# Patient Record
Sex: MALE | Race: OTHER | Employment: FULL TIME | URBAN - METROPOLITAN AREA
[De-identification: names, ages, dates, MRNs, and addresses within clinical notes are randomized per-mention and may not be internally consistent; named-entity substitution may affect disease eponyms.]

---

## 2017-11-02 ENCOUNTER — APPOINTMENT (OUTPATIENT)
Dept: OTHER | Facility: HOSPITAL | Age: 47
End: 2017-11-02
Attending: EMERGENCY MEDICINE

## 2018-12-05 ENCOUNTER — OFFICE VISIT (OUTPATIENT)
Dept: FAMILY MEDICINE CLINIC | Facility: CLINIC | Age: 48
End: 2018-12-05
Payer: COMMERCIAL

## 2018-12-05 VITALS
HEART RATE: 85 BPM | HEIGHT: 68 IN | BODY MASS INDEX: 37.89 KG/M2 | WEIGHT: 250 LBS | OXYGEN SATURATION: 97 % | SYSTOLIC BLOOD PRESSURE: 142 MMHG | DIASTOLIC BLOOD PRESSURE: 100 MMHG

## 2018-12-05 DIAGNOSIS — E78.5 HYPERLIPIDEMIA, UNSPECIFIED HYPERLIPIDEMIA TYPE: ICD-10-CM

## 2018-12-05 DIAGNOSIS — Z12.11 COLON CANCER SCREENING: ICD-10-CM

## 2018-12-05 DIAGNOSIS — Z00.00 WELLNESS EXAMINATION: Primary | ICD-10-CM

## 2018-12-05 DIAGNOSIS — I10 ESSENTIAL HYPERTENSION: ICD-10-CM

## 2018-12-05 DIAGNOSIS — Z23 NEED FOR VACCINATION: ICD-10-CM

## 2018-12-05 DIAGNOSIS — Z12.5 PROSTATE CANCER SCREENING: ICD-10-CM

## 2018-12-05 DIAGNOSIS — Z00.00 HEALTHCARE MAINTENANCE: ICD-10-CM

## 2018-12-05 DIAGNOSIS — Z13.6 SCREENING FOR CARDIOVASCULAR CONDITION: ICD-10-CM

## 2018-12-05 PROCEDURE — 99202 OFFICE O/P NEW SF 15 MIN: CPT | Performed by: FAMILY MEDICINE

## 2018-12-05 PROCEDURE — 90686 IIV4 VACC NO PRSV 0.5 ML IM: CPT | Performed by: FAMILY MEDICINE

## 2018-12-05 PROCEDURE — 90471 IMMUNIZATION ADMIN: CPT | Performed by: FAMILY MEDICINE

## 2018-12-05 PROCEDURE — 99386 PREV VISIT NEW AGE 40-64: CPT | Performed by: FAMILY MEDICINE

## 2018-12-05 RX ORDER — AMLODIPINE BESYLATE 10 MG/1
10 TABLET ORAL DAILY
Qty: 90 TABLET | Refills: 0 | Status: SHIPPED | OUTPATIENT
Start: 2018-12-05 | End: 2019-04-02

## 2018-12-05 RX ORDER — ATORVASTATIN CALCIUM 40 MG/1
40 TABLET, FILM COATED ORAL NIGHTLY
Qty: 90 TABLET | Refills: 0 | Status: SHIPPED | OUTPATIENT
Start: 2018-12-05 | End: 2019-04-02

## 2018-12-05 RX ORDER — AMLODIPINE BESYLATE 10 MG/1
10 TABLET ORAL DAILY
COMMUNITY
End: 2018-12-05

## 2018-12-05 RX ORDER — VALSARTAN AND HYDROCHLOROTHIAZIDE 320; 25 MG/1; MG/1
1 TABLET, FILM COATED ORAL DAILY
COMMUNITY
End: 2018-12-05

## 2018-12-05 RX ORDER — ROSUVASTATIN CALCIUM 20 MG/1
20 TABLET, COATED ORAL NIGHTLY
COMMUNITY
End: 2018-12-05

## 2018-12-05 RX ORDER — LISINOPRIL AND HYDROCHLOROTHIAZIDE 25; 20 MG/1; MG/1
1 TABLET ORAL DAILY
Qty: 90 TABLET | Refills: 0 | Status: SHIPPED | OUTPATIENT
Start: 2018-12-05 | End: 2019-04-02

## 2018-12-05 NOTE — PROGRESS NOTES
CC: Annual Physical Exam    HPI:   Geni Moran is a 46year old male who presents for a complete physical exam.     HCM  -Diet:   well-balanced  -Exercise: irregularly  -Mental Health: denies any depression or anxiety sx  -Skin care:  no concerning lesions/ edema, dyspnea on exertion or at rest.  RESPIRATORY:  Denies shortness of breath, wheezing, cough or sputum. GASTROINTESTINAL:  Denies abdominal pain, nausea, vomiting, constipation, diarrhea, or blood in stool.   MUSCULOSKELETAL:  Denies weakness, muscle (14) [E]      Hemoglobin A1C (Glycohemoglobin) [E]      Lipid Panel [E]      TSH W Reflex To Free T4 [E]      PSA (Screening) [E]      Flulaval 6 months and older 0.5 ml Quad PF [53231]      1. Wellness examination  2. Healthcare maintenance  3.  BMI 38.0-3

## 2018-12-06 PROBLEM — I10 ESSENTIAL HYPERTENSION: Status: ACTIVE | Noted: 2018-12-06

## 2018-12-06 PROBLEM — E78.5 HYPERLIPIDEMIA: Status: ACTIVE | Noted: 2018-12-06

## 2019-01-02 PROBLEM — E11.9 TYPE 2 DIABETES MELLITUS WITHOUT COMPLICATION, WITHOUT LONG-TERM CURRENT USE OF INSULIN (HCC): Status: ACTIVE | Noted: 2019-01-02

## 2019-01-15 ENCOUNTER — OFFICE VISIT (OUTPATIENT)
Dept: FAMILY MEDICINE CLINIC | Facility: CLINIC | Age: 49
End: 2019-01-15
Payer: COMMERCIAL

## 2019-01-15 ENCOUNTER — OFFICE VISIT (OUTPATIENT)
Dept: GASTROENTEROLOGY | Facility: CLINIC | Age: 49
End: 2019-01-15
Payer: COMMERCIAL

## 2019-01-15 ENCOUNTER — TELEPHONE (OUTPATIENT)
Dept: GASTROENTEROLOGY | Facility: CLINIC | Age: 49
End: 2019-01-15

## 2019-01-15 VITALS
SYSTOLIC BLOOD PRESSURE: 143 MMHG | WEIGHT: 246.63 LBS | HEART RATE: 89 BPM | HEIGHT: 68 IN | BODY MASS INDEX: 37.38 KG/M2 | DIASTOLIC BLOOD PRESSURE: 90 MMHG

## 2019-01-15 VITALS
HEART RATE: 85 BPM | SYSTOLIC BLOOD PRESSURE: 140 MMHG | DIASTOLIC BLOOD PRESSURE: 92 MMHG | BODY MASS INDEX: 36.68 KG/M2 | OXYGEN SATURATION: 98 % | HEIGHT: 68 IN | RESPIRATION RATE: 12 BRPM | WEIGHT: 242 LBS

## 2019-01-15 DIAGNOSIS — Z12.5 PROSTATE CANCER SCREENING: ICD-10-CM

## 2019-01-15 DIAGNOSIS — E11.9 TYPE 2 DIABETES MELLITUS WITHOUT COMPLICATION, WITHOUT LONG-TERM CURRENT USE OF INSULIN (HCC): Primary | ICD-10-CM

## 2019-01-15 DIAGNOSIS — E78.5 HYPERLIPIDEMIA, UNSPECIFIED HYPERLIPIDEMIA TYPE: ICD-10-CM

## 2019-01-15 DIAGNOSIS — Z12.11 ENCOUNTER FOR SCREENING COLONOSCOPY: Primary | ICD-10-CM

## 2019-01-15 DIAGNOSIS — Z12.11 COLON CANCER SCREENING: Primary | ICD-10-CM

## 2019-01-15 DIAGNOSIS — I10 ESSENTIAL HYPERTENSION: ICD-10-CM

## 2019-01-15 PROCEDURE — 99243 OFF/OP CNSLTJ NEW/EST LOW 30: CPT | Performed by: PHYSICIAN ASSISTANT

## 2019-01-15 PROCEDURE — 99214 OFFICE O/P EST MOD 30 MIN: CPT | Performed by: FAMILY MEDICINE

## 2019-01-15 PROCEDURE — 99212 OFFICE O/P EST SF 10 MIN: CPT | Performed by: PHYSICIAN ASSISTANT

## 2019-01-15 RX ORDER — POLYETHYLENE GLYCOL 3350, SODIUM CHLORIDE, SODIUM BICARBONATE, POTASSIUM CHLORIDE 420; 11.2; 5.72; 1.48 G/4L; G/4L; G/4L; G/4L
POWDER, FOR SOLUTION ORAL
Qty: 1 BOTTLE | Refills: 0 | Status: SHIPPED | OUTPATIENT
Start: 2019-01-15 | End: 2019-07-17

## 2019-01-15 NOTE — H&P
[address] [facility] [address] Gastroenterology                                                                                                  Clinic History and Physical     Pa social    Drug use: No       Medications (Active prior to today's visit):    Current Outpatient Medications:  PEG 3350-KCl-Na Bicarb-NaCl (TRILYTE) 420 g Oral Recon Soln Split dose preparation - take as directed.  Disp: 1 Bottle Rfl: 0   MetFORMIN HCl 500 M Patient's labs and imaging were reviewed and discussed with patient today.       ASSESSMENT/PLAN:   Alexander Lee is a 46year old male patient of Dr. Rodriguez Members with history of HTN, dyslipidemia, obesity and DM, who presents for colon cancer screeni anesthesia and perforation all leading to prolonged hospitalization, surgical intervention, or even death. I also specifically mentioned the miss rate of colonoscopy of 5-10% in the best of all circumstances.  All questions were answered to the patient’s sa

## 2019-01-15 NOTE — TELEPHONE ENCOUNTER
Scheduled for:  Colonoscopy 87102  Provider Name:   Date:  3/29/19  Location:  [facility]  Sedation:  Mac  Time:  1200 Pm / Arrival 80 Am  Prep: Split dose Trilyte  Meds/Allergies Reconciled?: Physician reviewed   Diagnosis with codes:  Colon Cancer Scree

## 2019-01-15 NOTE — PATIENT INSTRUCTIONS
1. Schedule colonoscopy with Dr. Nile Zhou with MAC anesthesia @ [facility].  - Patient's first preference is a Saturday  - Second preference is Friday     2.  bowel prep from pharmacy - I have prescribed Trilyte split dose preparation  3.  Medication Changes:

## 2019-01-20 NOTE — PROGRESS NOTES
HPI:   Patient presents with:  Diabetes: Newly diagnosed DM Gina Mayfield is a 46year old male presenting for:  HTN/HLD  -Taking meds inconsisntently-tolerating well without SEs.    -Denies CP, Palpitations, Dizziness, Recent fall, leg edema, TRIGLYCERIDES 273 (H) <150 mg/dL    LDL-CHOLESTEROL 93 mg/dL (calc)    CHOL/HDLC RATIO 4.5 <5.0 (calc)    NON-HDL CHOLESTEROL 129 <130 mg/dL (calc)   TSH W REFLEX TO FREE T4   Result Value Ref Range    TSH W/REFLEX TO FT4 1.14 0.40 - 4.50 mIU/L       Labs: Drinks per session: 1 or 2      Comment: social    Drug use: No     Family History:  No family history on file. REVIEW OF SYSTEMS:   Review of Systems   Constitutional: Negative for chills, fatigue and fever.    Respiratory: Negative for cough and hal ADA and/or low fat/cholesterol DASH diet and exercise (3 times a week for 30+ minutes each time)  · Refer to Ophthalmology annually for routine eye exam  · Check feet daily.   Podiatry evaluation prn.      - HEMOGLOBIN A1C  - OPHTHALMOLOGY - INTERNAL  - COLLETTE Screen due on 12/05/2019  Annual Physical due on 12/05/2019  LDL Control due on 12/29/2019  Influenza Vaccine Completed      Kelli Cox MD

## 2019-03-17 LAB
ALBUMIN/GLOBULIN RATIO: 2 (CALC) (ref 1–2.5)
ALBUMIN: 4.5 G/DL (ref 3.6–5.1)
ALKALINE PHOSPHATASE: 65 U/L (ref 40–115)
ALT: 19 U/L (ref 9–46)
AST: 17 U/L (ref 10–35)
BILIRUBIN, TOTAL: 0.5 MG/DL (ref 0.2–1.2)
BUN: 13 MG/DL (ref 7–25)
CALCIUM: 9.4 MG/DL (ref 8.6–10.3)
CARBON DIOXIDE: 26 MMOL/L (ref 20–32)
CHLORIDE: 101 MMOL/L (ref 98–110)
CHOL/HDLC RATIO: 4.1 (CALC)
CHOLESTEROL, TOTAL: 147 MG/DL
CREATININE: 0.87 MG/DL (ref 0.7–1.33)
EGFR IF AFRICN AM: 116 ML/MIN/1.73M2
EGFR IF NONAFRICN AM: 100 ML/MIN/1.73M2
GLOBULIN: 2.3 G/DL (CALC) (ref 1.9–3.7)
GLUCOSE: 110 MG/DL (ref 65–99)
HDL CHOLESTEROL: 36 MG/DL
HEMOGLOBIN A1C: 6.7 % OF TOTAL HGB
LDL-CHOLESTEROL: 85 MG/DL (CALC)
NON-HDL CHOLESTEROL: 111 MG/DL (CALC)
POTASSIUM: 4 MMOL/L (ref 3.5–5.3)
PROTEIN, TOTAL: 6.8 G/DL (ref 6.1–8.1)
PSA, TOTAL: 0.5 NG/ML
SODIUM: 136 MMOL/L (ref 135–146)
TRIGLYCERIDES: 154 MG/DL

## 2019-03-19 NOTE — TELEPHONE ENCOUNTER
Unable to leave messages ,when I call this pt number I get a busy signal then a hang up. Called 2 times,unsuccessful.

## 2019-03-22 ENCOUNTER — TELEPHONE (OUTPATIENT)
Dept: FAMILY MEDICINE CLINIC | Facility: CLINIC | Age: 49
End: 2019-03-22

## 2019-03-22 NOTE — TELEPHONE ENCOUNTER
----- Message from Dayday Silva MD sent at 3/22/2019  1:47 PM CDT -----  Please let him know:  1) diabetes well-controlled. Continue meds  2) cholesterol improving. Continue meds.  Increase healthy cholesterol with fish, nuts and avocado for exa

## 2019-03-26 ENCOUNTER — TELEPHONE (OUTPATIENT)
Dept: GASTROENTEROLOGY | Facility: CLINIC | Age: 49
End: 2019-03-26

## 2019-03-26 DIAGNOSIS — Z12.11 COLON CANCER SCREENING: Primary | ICD-10-CM

## 2019-03-26 NOTE — TELEPHONE ENCOUNTER
Pts wife/Deepa calling to confirm CLN, informed about CLN requiring rescheduling. Pts wife/Deepa using a temporary ph for today only L#[phone], number on file out of service, will attempt to transfer call, wilber billy at:[phone],thanks.   *English ok

## 2019-03-26 NOTE — TELEPHONE ENCOUNTER
Scheduled for:  Colonoscopy 54333  Provider Name: Dr. Tatiana Peraza  Date:  4/16/19  Location:  [facility]  Sedation:  MAC  Time:   3055 (pt is aware to arrive at 0930)   Prep:  Juan Tobar, carla Congolese instructions 3/27/19  Meds/Allergies Reconciled?:  Physician reviewed

## 2019-04-02 ENCOUNTER — OFFICE VISIT (OUTPATIENT)
Dept: FAMILY MEDICINE CLINIC | Facility: CLINIC | Age: 49
End: 2019-04-02
Payer: COMMERCIAL

## 2019-04-02 VITALS
SYSTOLIC BLOOD PRESSURE: 120 MMHG | WEIGHT: 241 LBS | BODY MASS INDEX: 36.53 KG/M2 | OXYGEN SATURATION: 94 % | HEART RATE: 83 BPM | HEIGHT: 68 IN | DIASTOLIC BLOOD PRESSURE: 80 MMHG

## 2019-04-02 DIAGNOSIS — E11.9 TYPE 2 DIABETES MELLITUS WITHOUT COMPLICATION, WITHOUT LONG-TERM CURRENT USE OF INSULIN (HCC): ICD-10-CM

## 2019-04-02 DIAGNOSIS — E78.5 HYPERLIPIDEMIA, UNSPECIFIED HYPERLIPIDEMIA TYPE: ICD-10-CM

## 2019-04-02 DIAGNOSIS — I10 ESSENTIAL HYPERTENSION: Primary | ICD-10-CM

## 2019-04-02 PROCEDURE — 90732 PPSV23 VACC 2 YRS+ SUBQ/IM: CPT | Performed by: FAMILY MEDICINE

## 2019-04-02 PROCEDURE — 90471 IMMUNIZATION ADMIN: CPT | Performed by: FAMILY MEDICINE

## 2019-04-02 PROCEDURE — 99214 OFFICE O/P EST MOD 30 MIN: CPT | Performed by: FAMILY MEDICINE

## 2019-04-02 RX ORDER — ATORVASTATIN CALCIUM 40 MG/1
40 TABLET, FILM COATED ORAL NIGHTLY
Qty: 90 TABLET | Refills: 0 | Status: SHIPPED | OUTPATIENT
Start: 2019-04-02 | End: 2019-07-17

## 2019-04-02 RX ORDER — LISINOPRIL AND HYDROCHLOROTHIAZIDE 25; 20 MG/1; MG/1
1 TABLET ORAL DAILY
Qty: 90 TABLET | Refills: 0 | Status: SHIPPED | OUTPATIENT
Start: 2019-04-02 | End: 2019-07-17

## 2019-04-02 RX ORDER — AMLODIPINE BESYLATE 10 MG/1
10 TABLET ORAL DAILY
Qty: 90 TABLET | Refills: 0 | Status: SHIPPED | OUTPATIENT
Start: 2019-04-02 | End: 2019-07-17

## 2019-04-02 NOTE — PROGRESS NOTES
HPI:   Patient presents with:  Lab Results  Follow - Up: 3 months   Medication Follow-Up      Vicky Meneses is a 44year old male presenting for:  HTN/HLD  -Taking meds -tolerating well without SEs.    -Denies CP, Palpitations, Dizziness, Recent fall, leg e TRIG 154 (H) 03/16/2019 10:43 AM    LDL 85 03/16/2019 10:43 AM    NONHDLC 111 03/16/2019 10:43 AM       Lab Results   Component Value Date/Time     (H) 03/16/2019 10:43 AM     03/16/2019 10:43 AM    K 4.0 03/16/2019 10:43 AM     03/16 Negative for headaches. PHYSICAL EXAM:   /80   Pulse 83   Ht 68\"   Wt 241 lb   SpO2 94%   BMI 36.64 kg/m²  Estimated body mass index is 36.64 kg/m² as calculated from the following:    Height as of this encounter: 68\".     Weight as of this Podiatry evaluation prn. Follow-up in 3mo  Patient indicates understanding of the above recommendations and agrees to the above plan. Reasurrance and education provided. All questions answered.   Notified to call with any questions, complications, all

## 2019-04-15 ENCOUNTER — TELEPHONE (OUTPATIENT)
Dept: GASTROENTEROLOGY | Facility: CLINIC | Age: 49
End: 2019-04-15

## 2019-04-15 DIAGNOSIS — Z12.11 COLON CANCER SCREENING: Primary | ICD-10-CM

## 2019-04-15 NOTE — TELEPHONE ENCOUNTER
Pts wife/Deepa calling for pt, Cancelling CLN due to ins not covering for Dr Eduardo Walker procedure, thanks, [address] caller.

## 2019-04-15 NOTE — TELEPHONE ENCOUNTER
Cancelled for:  Colonoscopy 83503  Provider Name: Dr. Timmy Mcclellan  Date:  4/16/19  Location:  [facility]  Sedation:  MAC  Time:   56   Prep:  Estephanie Nunez  Meds/Allergies Reconciled?:  Physician reviewed   Diagnosis with codes:  Colon cancer screening Z12.11  Was patient

## 2019-04-16 ENCOUNTER — TELEPHONE (OUTPATIENT)
Dept: FAMILY MEDICINE CLINIC | Facility: CLINIC | Age: 49
End: 2019-04-16

## 2019-04-16 DIAGNOSIS — Z12.11 COLON CANCER SCREENING: Primary | ICD-10-CM

## 2019-04-16 NOTE — TELEPHONE ENCOUNTER
Pt's wife called, she has made an appointment with the GI that was referred, but after calling her insurance company she cancelled appointment. Insurance is telling her [facility] that is in-network.  She spoke to [employer] [facility], they are asking

## 2019-07-14 LAB
CHOL/HDLC RATIO: 3.2 (CALC)
CHOLESTEROL, TOTAL: 132 MG/DL
HDL CHOLESTEROL: 41 MG/DL
HEMOGLOBIN A1C: 6.1 % OF TOTAL HGB
LDL-CHOLESTEROL: 68 MG/DL (CALC)
NON-HDL CHOLESTEROL: 91 MG/DL (CALC)
TRIGLYCERIDES: 146 MG/DL

## 2019-07-17 ENCOUNTER — OFFICE VISIT (OUTPATIENT)
Dept: FAMILY MEDICINE CLINIC | Facility: CLINIC | Age: 49
End: 2019-07-17
Payer: COMMERCIAL

## 2019-07-17 VITALS
SYSTOLIC BLOOD PRESSURE: 126 MMHG | BODY MASS INDEX: 35.01 KG/M2 | HEIGHT: 68 IN | HEART RATE: 84 BPM | DIASTOLIC BLOOD PRESSURE: 88 MMHG | RESPIRATION RATE: 14 BRPM | OXYGEN SATURATION: 99 % | WEIGHT: 231 LBS

## 2019-07-17 DIAGNOSIS — E78.5 HYPERLIPIDEMIA, UNSPECIFIED HYPERLIPIDEMIA TYPE: ICD-10-CM

## 2019-07-17 DIAGNOSIS — E11.9 TYPE 2 DIABETES MELLITUS WITHOUT COMPLICATION, WITHOUT LONG-TERM CURRENT USE OF INSULIN (HCC): ICD-10-CM

## 2019-07-17 DIAGNOSIS — I10 ESSENTIAL HYPERTENSION: Primary | ICD-10-CM

## 2019-07-17 PROCEDURE — 99214 OFFICE O/P EST MOD 30 MIN: CPT | Performed by: FAMILY MEDICINE

## 2019-07-17 RX ORDER — LISINOPRIL AND HYDROCHLOROTHIAZIDE 25; 20 MG/1; MG/1
1 TABLET ORAL DAILY
Qty: 90 TABLET | Refills: 0 | Status: SHIPPED | OUTPATIENT
Start: 2019-07-17 | End: 2019-12-02

## 2019-07-17 RX ORDER — AMLODIPINE BESYLATE 10 MG/1
10 TABLET ORAL DAILY
Qty: 90 TABLET | Refills: 0 | Status: SHIPPED | OUTPATIENT
Start: 2019-07-17 | End: 2019-12-02

## 2019-07-17 RX ORDER — ATORVASTATIN CALCIUM 40 MG/1
40 TABLET, FILM COATED ORAL NIGHTLY
Qty: 90 TABLET | Refills: 0 | Status: SHIPPED | OUTPATIENT
Start: 2019-07-17 | End: 2019-12-02

## 2019-07-17 NOTE — PROGRESS NOTES
HPI:   Patient presents with:  Diabetes      Perry Gip is a 44year old male presenting for:  HTN/HLD  -Taking meds as prescribed-tolerating well without SEs.   -Home BP readings are wnl  -Denies CP, Palpitations, Dizziness, leg edema, SOB, LOC, HAs mouth daily.  Disp: 90 tablet Rfl: 0   metFORMIN HCl 500 MG Oral Tab Take 1 tablet (500 mg total) by mouth daily with breakfast. Disp: 90 tablet Rfl: 0      Past Medical History:   Diagnosis Date   • Essential hypertension    • High cholesterol          No presents primarily presents for:    1.  Essential hypertension  -stable; controlled   -continue meds  -bloodwork: utd   -watch salt intake, regular exercise, DASH/heart healthy eating  -monitor home BP daily and maintain log; if elevated reading >160/100 no 03/16/2021  Influenza Vaccine Completed      Luis Wright MD

## 2019-07-26 ENCOUNTER — TELEPHONE (OUTPATIENT)
Dept: GASTROENTEROLOGY | Facility: CLINIC | Age: 49
End: 2019-07-26

## 2019-07-26 DIAGNOSIS — Z12.11 COLON CANCER SCREENING: Primary | ICD-10-CM

## 2019-07-26 NOTE — TELEPHONE ENCOUNTER
Scheduled for:  Colonoscopy 24846  Provider Name: Dr. Stefani Box  Date:  8/27/19  Location:  [facility]  Sedation:  MAC  Time:   1477 (pt is aware to arrive at [address])   Prep:  Jerson, mailed Latvian instructions 7/29/19  Meds/Allergies Reconciled?:  Physician reviewed

## 2019-07-26 NOTE — TELEPHONE ENCOUNTER
Pts wife/Deepa calling to schedule procedure, asking for  (not ). Pls call at:  [phone],thanks.

## 2019-08-26 ENCOUNTER — TELEPHONE (OUTPATIENT)
Dept: GASTROENTEROLOGY | Facility: CLINIC | Age: 49
End: 2019-08-26

## 2019-08-26 NOTE — TELEPHONE ENCOUNTER
Wife calling to reschedule CLN. Attempt to transfer. Wife aware of at least 72hr call back time.  Please call [phone]

## 2019-08-26 NOTE — TELEPHONE ENCOUNTER
Jerome Matias from [facility] called to confirm below, as they had spoken to spouse, I removed from epic and forwarded to GI schedulers . Thanks. Left message on voicemail that schedulers would be calling within a week to reschedule.

## 2019-08-27 NOTE — TELEPHONE ENCOUNTER
[facility] to schedule procedure. Please transfer to Maryse Blum at ext [phone] or [phone] for scheduling.

## 2019-12-02 ENCOUNTER — TELEPHONE (OUTPATIENT)
Dept: FAMILY MEDICINE CLINIC | Facility: CLINIC | Age: 49
End: 2019-12-02

## 2019-12-02 RX ORDER — ATORVASTATIN CALCIUM 40 MG/1
40 TABLET, FILM COATED ORAL NIGHTLY
Qty: 30 TABLET | Refills: 0 | Status: SHIPPED | OUTPATIENT
Start: 2019-12-02 | End: 2020-01-08

## 2019-12-02 RX ORDER — LISINOPRIL AND HYDROCHLOROTHIAZIDE 25; 20 MG/1; MG/1
1 TABLET ORAL DAILY
Qty: 30 TABLET | Refills: 0 | Status: SHIPPED | OUTPATIENT
Start: 2019-12-02 | End: 2019-12-28

## 2019-12-02 RX ORDER — AMLODIPINE BESYLATE 10 MG/1
10 TABLET ORAL DAILY
Qty: 30 TABLET | Refills: 0 | Status: SHIPPED | OUTPATIENT
Start: 2019-12-02 | End: 2020-01-08

## 2019-12-02 NOTE — TELEPHONE ENCOUNTER
Pt called to sched an appt with Dr.M. YOON sched him with Dr. Arvind Mcghee for 12/26, but he needs refills for his cholesterol medication, diabetes and BP medication. He has no more medicine.

## 2019-12-13 NOTE — TELEPHONE ENCOUNTER
I called and left message with wife for pt to return our call, she stated that they have change insurance, to [facility]. I informed her that Dr Julia Umana sees pt's with that insurance and that he is scheduling out into Feb 2020.    She agreed to deliver message and

## 2019-12-28 ENCOUNTER — APPOINTMENT (OUTPATIENT)
Dept: LAB | Age: 49
End: 2019-12-28
Attending: FAMILY MEDICINE
Payer: COMMERCIAL

## 2019-12-28 ENCOUNTER — OFFICE VISIT (OUTPATIENT)
Dept: FAMILY MEDICINE CLINIC | Facility: CLINIC | Age: 49
End: 2019-12-28
Payer: COMMERCIAL

## 2019-12-28 VITALS
WEIGHT: 225 LBS | BODY MASS INDEX: 34.1 KG/M2 | HEART RATE: 89 BPM | SYSTOLIC BLOOD PRESSURE: 143 MMHG | HEIGHT: 68 IN | TEMPERATURE: 98 F | DIASTOLIC BLOOD PRESSURE: 89 MMHG

## 2019-12-28 DIAGNOSIS — E11.9 DIABETES MELLITUS TYPE 2 IN NONOBESE (HCC): Primary | ICD-10-CM

## 2019-12-28 DIAGNOSIS — Z12.11 SCREENING FOR COLON CANCER: ICD-10-CM

## 2019-12-28 PROCEDURE — 84443 ASSAY THYROID STIM HORMONE: CPT | Performed by: FAMILY MEDICINE

## 2019-12-28 PROCEDURE — 82570 ASSAY OF URINE CREATININE: CPT | Performed by: FAMILY MEDICINE

## 2019-12-28 PROCEDURE — 80061 LIPID PANEL: CPT | Performed by: FAMILY MEDICINE

## 2019-12-28 PROCEDURE — 99212 OFFICE O/P EST SF 10 MIN: CPT | Performed by: FAMILY MEDICINE

## 2019-12-28 PROCEDURE — 82043 UR ALBUMIN QUANTITATIVE: CPT | Performed by: FAMILY MEDICINE

## 2019-12-28 PROCEDURE — 83036 HEMOGLOBIN GLYCOSYLATED A1C: CPT | Performed by: FAMILY MEDICINE

## 2019-12-28 PROCEDURE — 36415 COLL VENOUS BLD VENIPUNCTURE: CPT | Performed by: FAMILY MEDICINE

## 2019-12-28 PROCEDURE — 99203 OFFICE O/P NEW LOW 30 MIN: CPT | Performed by: FAMILY MEDICINE

## 2019-12-28 PROCEDURE — 80053 COMPREHEN METABOLIC PANEL: CPT | Performed by: FAMILY MEDICINE

## 2019-12-28 PROCEDURE — 85025 COMPLETE CBC W/AUTO DIFF WBC: CPT | Performed by: FAMILY MEDICINE

## 2019-12-28 RX ORDER — LISINOPRIL AND HYDROCHLOROTHIAZIDE 20; 12.5 MG/1; MG/1
2 TABLET ORAL DAILY
Qty: 180 TABLET | Refills: 1 | Status: SHIPPED | OUTPATIENT
Start: 2019-12-28 | End: 2020-05-26

## 2019-12-28 NOTE — PROGRESS NOTES
HPI:    Patient ID: Saeid Thompson is a 44year old male. Patient here first time  Has diabetes hypertension /  Feeling well no complaints      Review of Systems   Constitutional: Negative. Negative for activity change, diaphoresis and fatigue.    Respirato 20-12.5 MG Oral Tab 180 tablet 1     Sig: Take 2 tablets by mouth daily.        Imaging & Referrals:  OPHTHALMOLOGY - INTERNAL  OP REFERRAL TO [facility] GI TELEPHONE COLON SCREEN       DX#[id number]

## 2020-01-06 ENCOUNTER — TELEPHONE (OUTPATIENT)
Dept: FAMILY MEDICINE CLINIC | Facility: CLINIC | Age: 50
End: 2020-01-06

## 2020-01-06 ENCOUNTER — TELEPHONE (OUTPATIENT)
Dept: GASTROENTEROLOGY | Facility: CLINIC | Age: 50
End: 2020-01-06

## 2020-01-06 NOTE — TELEPHONE ENCOUNTER
Patient wife/Deepa calling to schedule colonoscopy, requesting to scheduled with , informed of the 72 hour call back, please call with  DW:[phone],SHAYYСВЕТЛАНА

## 2020-01-06 NOTE — TELEPHONE ENCOUNTER
Ukrainian speaking wife on the phone wants to know if medication (amlodipine) is going to be discontinued due to higher dosage of new medication (lisinopril). Also wants to know if she's going to be prescribing patient medication for cholesterol and diabetes or is she waiting for his blood work.    Please advise

## 2020-01-07 NOTE — TELEPHONE ENCOUNTER
He needs to continue amlodipine also. He is on metformin and lipitor and he needs to continu that .  I need to see him in 1 month

## 2020-01-08 RX ORDER — ATORVASTATIN CALCIUM 40 MG/1
40 TABLET, FILM COATED ORAL NIGHTLY
Qty: 30 TABLET | Refills: 0 | Status: SHIPPED | OUTPATIENT
Start: 2020-01-08 | End: 2020-04-01

## 2020-01-08 RX ORDER — AMLODIPINE BESYLATE 10 MG/1
10 TABLET ORAL DAILY
Qty: 30 TABLET | Refills: 0 | Status: SHIPPED | OUTPATIENT
Start: 2020-01-08 | End: 2020-02-18

## 2020-01-08 NOTE — TELEPHONE ENCOUNTER
Message noted. I have spoken with Dr. Anjum Juarez. I will refill medications for 1 month supply as patient needs return in 1 month for follow-up. Dr. Anjum Juarez will comment on labs later today.

## 2020-01-08 NOTE — TELEPHONE ENCOUNTER
[facility] C[facility] Gomez who confirms prescriptions for amlodipine, atorvastatin, and metformin have been received. With  Dayami Villagomez ID # [id number]: to call back wife Maya Hall (XIANG on file) to let her know that the prescriptions are ready to be picked up at [facility] DR GINGER MCGEE in [place]. Wife is aware that lab results are still pending and will be called back once results are available. 1 month f/u appointment made for 1/28/19 with Dr. Katia Miranda.

## 2020-01-08 NOTE — TELEPHONE ENCOUNTER
Call transferred from [facility]; With  Nisa Pack ID # [id number]: Wife Aron Ramirez calling (XIANG on file). Notified of Dr. Matos Crew message below. Wife states has been able to  lisinopril/hctz from pharmacy but not able to    metformin   lipitor  Amlodipine     Pharmacy told wife no prescription received. Metformin, lipitor, amlodipine show were prescribed by Dr. Alfredo Cardenas and have  20. Atorvastatin 12 pills left  Metformin 12 pills left  Amlodipine 9 pills left. Wife requesting lab results have not been commented on. Wife is upset states not getting answers wants to make sure pt has lab result answers. Dr. Anson Van prescriptions have been pended for review, also please advise for lab results that have not been commented on.

## 2020-01-09 NOTE — TELEPHONE ENCOUNTER
Scheduled for:  COLON 83664  Provider Name:Dr Julia Umana  Date:  3/16/2020  Location: [facility]   Sedation:  MAC  Time: 9:30am (pt is aware that [facility] will call the day before to confirm arrival time)  Prep: Trilyte  Meds/Allergies Reconciled?:  Physician reviewed  Alicia

## 2020-01-10 RX ORDER — POLYETHYLENE GLYCOL 3350, SODIUM CHLORIDE, SODIUM BICARBONATE, POTASSIUM CHLORIDE 420; 11.2; 5.72; 1.48 G/4L; G/4L; G/4L; G/4L
POWDER, FOR SOLUTION ORAL
Qty: 1 BOTTLE | Refills: 0 | Status: SHIPPED | OUTPATIENT
Start: 2020-01-10

## 2020-01-28 NOTE — TELEPHONE ENCOUNTER
I tried calling could not leave message. resulst are satisfactory but his triglycerides that could be better. Can you call the patient and send him results.  Avoid sweets and cardohydrates and fried foods

## 2020-01-28 NOTE — TELEPHONE ENCOUNTER
Reji Maurice pt wife on Hip[employer] was informed of Dr. Mary William message below and she verbalized understanding. She also asked that I cancel pt appt as he will not be able to make it in today.  Appt was cancelled per wife request.

## 2020-02-05 NOTE — TELEPHONE ENCOUNTER
Pt's original OV was with Cari Saleem on 01/15/19.     Colonoscopy was scheduled with Dr Denis Strauss on 03/29/19    Colon was rescheduled to Dr Adriano Garcia on 04/16/19    Colon with Dr Adriano Garcia was cancelled and rescheduled with him on 08/27/19    Colon is now rescheduled w

## 2020-02-18 RX ORDER — AMLODIPINE BESYLATE 10 MG/1
TABLET ORAL
Qty: 30 TABLET | Refills: 1 | Status: SHIPPED | OUTPATIENT
Start: 2020-02-18 | End: 2020-05-09

## 2020-04-01 RX ORDER — ATORVASTATIN CALCIUM 40 MG/1
TABLET, FILM COATED ORAL
Qty: 30 TABLET | Refills: 0 | Status: SHIPPED | OUTPATIENT
Start: 2020-04-01 | End: 2020-05-21

## 2020-05-04 NOTE — TELEPHONE ENCOUNTER
Called Pt. LVM to inform pt that she needs to schedule a phone visit with Dr Randy Horton in regards of Rx refill. Left office number for a call back.

## 2020-05-09 RX ORDER — AMLODIPINE BESYLATE 10 MG/1
TABLET ORAL
Qty: 30 TABLET | Refills: 0 | Status: SHIPPED | OUTPATIENT
Start: 2020-05-09 | End: 2020-05-21

## 2020-05-18 RX ORDER — ATORVASTATIN CALCIUM 40 MG/1
TABLET, FILM COATED ORAL
Qty: 30 TABLET | Refills: 0 | OUTPATIENT
Start: 2020-05-18

## 2020-05-18 RX ORDER — AMLODIPINE BESYLATE 10 MG/1
TABLET ORAL
Qty: 30 TABLET | Refills: 0 | OUTPATIENT
Start: 2020-05-18

## 2020-05-18 NOTE — TELEPHONE ENCOUNTER
Called pt LM with wife in regards to calling office back to make telephone appt for fu and med refills

## 2020-05-21 NOTE — TELEPHONE ENCOUNTER
This is being sent to you without review by the Triage staff due to the high call volumes created by the COVID-19 virus, per the email sent by Dr. Yanely Garcia on 3/19/20. Thank you for your support.     Centralized Nurse Triage Team

## 2020-05-25 RX ORDER — ATORVASTATIN CALCIUM 40 MG/1
40 TABLET, FILM COATED ORAL NIGHTLY
Qty: 30 TABLET | Refills: 0 | Status: SHIPPED | OUTPATIENT
Start: 2020-05-25 | End: 2020-05-26

## 2020-05-25 RX ORDER — AMLODIPINE BESYLATE 10 MG/1
10 TABLET ORAL DAILY
Qty: 30 TABLET | Refills: 0 | Status: SHIPPED | OUTPATIENT
Start: 2020-05-25 | End: 2020-05-26

## 2020-05-26 ENCOUNTER — VIRTUAL PHONE E/M (OUTPATIENT)
Dept: FAMILY MEDICINE CLINIC | Facility: CLINIC | Age: 50
End: 2020-05-26
Payer: COMMERCIAL

## 2020-05-26 DIAGNOSIS — E11.69 DIABETES MELLITUS TYPE 2 IN OBESE (HCC): Primary | ICD-10-CM

## 2020-05-26 DIAGNOSIS — I10 ESSENTIAL HYPERTENSION: ICD-10-CM

## 2020-05-26 DIAGNOSIS — E66.9 DIABETES MELLITUS TYPE 2 IN OBESE (HCC): Primary | ICD-10-CM

## 2020-05-26 PROCEDURE — 99214 OFFICE O/P EST MOD 30 MIN: CPT | Performed by: FAMILY MEDICINE

## 2020-05-26 RX ORDER — AMLODIPINE BESYLATE 10 MG/1
10 TABLET ORAL DAILY
Qty: 30 TABLET | Refills: 0 | Status: SHIPPED | OUTPATIENT
Start: 2020-05-26 | End: 2020-09-22

## 2020-05-26 RX ORDER — ATORVASTATIN CALCIUM 40 MG/1
40 TABLET, FILM COATED ORAL NIGHTLY
Qty: 90 TABLET | Refills: 1 | Status: SHIPPED | OUTPATIENT
Start: 2020-05-26

## 2020-05-26 RX ORDER — GLYCERIN ADULT
SUPPOSITORY, RECTAL RECTAL
Qty: 1 DEVICE | Refills: 0 | Status: SHIPPED | OUTPATIENT
Start: 2020-05-26

## 2020-05-26 RX ORDER — LISINOPRIL AND HYDROCHLOROTHIAZIDE 20; 12.5 MG/1; MG/1
2 TABLET ORAL DAILY
Qty: 180 TABLET | Refills: 1 | Status: SHIPPED | OUTPATIENT
Start: 2020-05-26

## 2020-05-26 NOTE — PROGRESS NOTES
HPI:    Patient ID: Denise Muhammad is a 44year old male. Patient consents to telephone visit and accepts financial responsibility  15 min spent with the patient on the phone  Name: Denise Muhammad  Date: 5/26/2020    Referring Physician: No ref.  provider found 30 tablet 0   • PEG 3350-KCl-Na Bicarb-NaCl (TRILYTE) 420 g Oral Recon Soln Take prep as directed by gastro office. May substitute with Trilyte/generic equivalent if needed.  1 Bottle 0     Allergies:No Known Allergies   PHYSICAL EXAM:   Physical Exam  Pt i

## 2020-09-22 RX ORDER — AMLODIPINE BESYLATE 10 MG/1
TABLET ORAL
Qty: 30 TABLET | Refills: 0 | Status: SHIPPED | OUTPATIENT
Start: 2020-09-22

## 2023-02-02 ENCOUNTER — OFFICE VISIT (OUTPATIENT)
Dept: FAMILY MEDICINE CLINIC | Facility: CLINIC | Age: 53
End: 2023-02-02

## 2023-02-02 VITALS
WEIGHT: 225 LBS | SYSTOLIC BLOOD PRESSURE: 139 MMHG | HEIGHT: 69.4 IN | BODY MASS INDEX: 32.95 KG/M2 | HEART RATE: 83 BPM | DIASTOLIC BLOOD PRESSURE: 86 MMHG

## 2023-02-02 DIAGNOSIS — Z11.59 ENCOUNTER FOR HEPATITIS C SCREENING TEST FOR LOW RISK PATIENT: ICD-10-CM

## 2023-02-02 DIAGNOSIS — E78.5 HYPERLIPIDEMIA, UNSPECIFIED HYPERLIPIDEMIA TYPE: ICD-10-CM

## 2023-02-02 DIAGNOSIS — R35.1 NOCTURIA: ICD-10-CM

## 2023-02-02 DIAGNOSIS — Z12.11 COLON CANCER SCREENING: ICD-10-CM

## 2023-02-02 DIAGNOSIS — M54.2 NECK PAIN: ICD-10-CM

## 2023-02-02 DIAGNOSIS — Z91.89 STREPTOCOCCUS PNEUMONIAE VACCINATION INDICATED: ICD-10-CM

## 2023-02-02 DIAGNOSIS — I10 ESSENTIAL HYPERTENSION: ICD-10-CM

## 2023-02-02 DIAGNOSIS — E11.9 TYPE 2 DIABETES MELLITUS WITHOUT COMPLICATION, WITHOUT LONG-TERM CURRENT USE OF INSULIN (HCC): ICD-10-CM

## 2023-02-02 DIAGNOSIS — Z00.00 ANNUAL PHYSICAL EXAM: Primary | ICD-10-CM

## 2023-02-02 PROCEDURE — 3079F DIAST BP 80-89 MM HG: CPT | Performed by: FAMILY MEDICINE

## 2023-02-02 PROCEDURE — 99396 PREV VISIT EST AGE 40-64: CPT | Performed by: FAMILY MEDICINE

## 2023-02-02 PROCEDURE — 99214 OFFICE O/P EST MOD 30 MIN: CPT | Performed by: FAMILY MEDICINE

## 2023-02-02 PROCEDURE — 90471 IMMUNIZATION ADMIN: CPT | Performed by: FAMILY MEDICINE

## 2023-02-02 PROCEDURE — 90677 PCV20 VACCINE IM: CPT | Performed by: FAMILY MEDICINE

## 2023-02-02 PROCEDURE — 3075F SYST BP GE 130 - 139MM HG: CPT | Performed by: FAMILY MEDICINE

## 2023-02-02 PROCEDURE — 3008F BODY MASS INDEX DOCD: CPT | Performed by: FAMILY MEDICINE

## 2023-02-10 ENCOUNTER — TELEPHONE (OUTPATIENT)
Dept: FAMILY MEDICINE CLINIC | Facility: CLINIC | Age: 53
End: 2023-02-10

## 2023-02-10 RX ORDER — AMLODIPINE BESYLATE 10 MG/1
10 TABLET ORAL DAILY
Qty: 90 TABLET | Refills: 1 | Status: SHIPPED | OUTPATIENT
Start: 2023-02-10

## 2023-02-10 RX ORDER — ATORVASTATIN CALCIUM 40 MG/1
40 TABLET, FILM COATED ORAL NIGHTLY
Qty: 90 TABLET | Refills: 1 | Status: SHIPPED | OUTPATIENT
Start: 2023-02-10

## 2023-02-10 RX ORDER — LISINOPRIL AND HYDROCHLOROTHIAZIDE 20; 12.5 MG/1; MG/1
2 TABLET ORAL DAILY
Qty: 180 TABLET | Refills: 1 | Status: SHIPPED | OUTPATIENT
Start: 2023-02-10

## 2023-02-10 NOTE — TELEPHONE ENCOUNTER
Patient calling to ask to switch following medications to [address]:     Atorvastatin  Amlodipine  Metformin   Lisinopril

## 2023-02-25 ENCOUNTER — LAB ENCOUNTER (OUTPATIENT)
Dept: LAB | Age: 53
End: 2023-02-25
Attending: FAMILY MEDICINE
Payer: COMMERCIAL

## 2023-02-25 DIAGNOSIS — R35.1 NOCTURIA: ICD-10-CM

## 2023-02-25 LAB
ALBUMIN SERPL-MCNC: 3.9 G/DL (ref 3.4–5)
ALBUMIN/GLOB SERPL: 1.1 {RATIO} (ref 1–2)
ALP LIVER SERPL-CCNC: 75 U/L
ALT SERPL-CCNC: 31 U/L
ANION GAP SERPL CALC-SCNC: 7 MMOL/L (ref 0–18)
AST SERPL-CCNC: 20 U/L (ref 15–37)
BILIRUB SERPL-MCNC: 0.6 MG/DL (ref 0.1–2)
BUN BLD-MCNC: 16 MG/DL (ref 7–18)
BUN/CREAT SERPL: 13.3 (ref 10–20)
CALCIUM BLD-MCNC: 9.6 MG/DL (ref 8.5–10.1)
CHLORIDE SERPL-SCNC: 101 MMOL/L (ref 98–112)
CHOLEST SERPL-MCNC: 135 MG/DL (ref ?–200)
CO2 SERPL-SCNC: 28 MMOL/L (ref 21–32)
COMPLEXED PSA SERPL-MCNC: 0.64 NG/ML (ref ?–4)
CREAT BLD-MCNC: 1.2 MG/DL
CREAT UR-SCNC: 133 MG/DL
EST. AVERAGE GLUCOSE BLD GHB EST-MCNC: 120 MG/DL (ref 68–126)
FASTING PATIENT LIPID ANSWER: YES
FASTING STATUS PATIENT QL REPORTED: YES
GFR SERPLBLD BASED ON 1.73 SQ M-ARVRAT: 71 ML/MIN/1.73M2 (ref 60–?)
GLOBULIN PLAS-MCNC: 3.7 G/DL (ref 2.8–4.4)
GLUCOSE BLD-MCNC: 95 MG/DL (ref 70–99)
HBA1C MFR BLD: 5.8 % (ref ?–5.7)
HCV AB SERPL QL IA: NONREACTIVE
HDLC SERPL-MCNC: 42 MG/DL (ref 40–59)
LDLC SERPL CALC-MCNC: 66 MG/DL (ref ?–100)
MICROALBUMIN UR-MCNC: 1.64 MG/DL
MICROALBUMIN/CREAT 24H UR-RTO: 12.3 UG/MG (ref ?–30)
NONHDLC SERPL-MCNC: 93 MG/DL (ref ?–130)
OSMOLALITY SERPL CALC.SUM OF ELEC: 283 MOSM/KG (ref 275–295)
POTASSIUM SERPL-SCNC: 4.3 MMOL/L (ref 3.5–5.1)
PROT SERPL-MCNC: 7.6 G/DL (ref 6.4–8.2)
SODIUM SERPL-SCNC: 136 MMOL/L (ref 136–145)
TRIGL SERPL-MCNC: 158 MG/DL (ref 30–149)
VIT B12 SERPL-MCNC: 408 PG/ML (ref 193–986)
VLDLC SERPL CALC-MCNC: 24 MG/DL (ref 0–30)

## 2023-02-25 PROCEDURE — 83036 HEMOGLOBIN GLYCOSYLATED A1C: CPT | Performed by: FAMILY MEDICINE

## 2023-02-25 PROCEDURE — 82043 UR ALBUMIN QUANTITATIVE: CPT | Performed by: FAMILY MEDICINE

## 2023-02-25 PROCEDURE — 3061F NEG MICROALBUMINURIA REV: CPT | Performed by: FAMILY MEDICINE

## 2023-02-25 PROCEDURE — 36415 COLL VENOUS BLD VENIPUNCTURE: CPT | Performed by: FAMILY MEDICINE

## 2023-02-25 PROCEDURE — 82570 ASSAY OF URINE CREATININE: CPT | Performed by: FAMILY MEDICINE

## 2023-02-25 PROCEDURE — 80053 COMPREHEN METABOLIC PANEL: CPT | Performed by: FAMILY MEDICINE

## 2023-02-25 PROCEDURE — 82607 VITAMIN B-12: CPT | Performed by: FAMILY MEDICINE

## 2023-02-25 PROCEDURE — 80061 LIPID PANEL: CPT | Performed by: FAMILY MEDICINE

## 2023-02-25 PROCEDURE — 86803 HEPATITIS C AB TEST: CPT | Performed by: FAMILY MEDICINE

## 2023-03-03 ENCOUNTER — TELEPHONE (OUTPATIENT)
Dept: PHYSICAL THERAPY | Facility: HOSPITAL | Age: 53
End: 2023-03-03

## 2023-03-07 ENCOUNTER — APPOINTMENT (OUTPATIENT)
Dept: PHYSICAL THERAPY | Facility: HOSPITAL | Age: 53
End: 2023-03-07
Attending: FAMILY MEDICINE
Payer: COMMERCIAL

## 2023-03-07 ENCOUNTER — TELEPHONE (OUTPATIENT)
Dept: PHYSICAL THERAPY | Facility: HOSPITAL | Age: 53
End: 2023-03-07

## 2023-03-09 ENCOUNTER — TELEPHONE (OUTPATIENT)
Dept: PHYSICAL THERAPY | Facility: HOSPITAL | Age: 53
End: 2023-03-09

## 2023-03-09 ENCOUNTER — APPOINTMENT (OUTPATIENT)
Dept: PHYSICAL THERAPY | Facility: HOSPITAL | Age: 53
End: 2023-03-09
Attending: FAMILY MEDICINE
Payer: COMMERCIAL

## 2023-03-13 ENCOUNTER — APPOINTMENT (OUTPATIENT)
Dept: PHYSICAL THERAPY | Facility: HOSPITAL | Age: 53
End: 2023-03-13
Attending: FAMILY MEDICINE
Payer: COMMERCIAL

## 2023-03-16 ENCOUNTER — APPOINTMENT (OUTPATIENT)
Dept: PHYSICAL THERAPY | Facility: HOSPITAL | Age: 53
End: 2023-03-16
Attending: FAMILY MEDICINE
Payer: COMMERCIAL

## 2023-03-20 ENCOUNTER — APPOINTMENT (OUTPATIENT)
Dept: PHYSICAL THERAPY | Facility: HOSPITAL | Age: 53
End: 2023-03-20
Attending: FAMILY MEDICINE
Payer: COMMERCIAL

## 2023-03-23 ENCOUNTER — APPOINTMENT (OUTPATIENT)
Dept: PHYSICAL THERAPY | Facility: HOSPITAL | Age: 53
End: 2023-03-23
Attending: FAMILY MEDICINE
Payer: COMMERCIAL

## 2023-03-27 ENCOUNTER — APPOINTMENT (OUTPATIENT)
Dept: PHYSICAL THERAPY | Facility: HOSPITAL | Age: 53
End: 2023-03-27
Attending: FAMILY MEDICINE
Payer: COMMERCIAL

## 2023-03-30 ENCOUNTER — APPOINTMENT (OUTPATIENT)
Dept: PHYSICAL THERAPY | Facility: HOSPITAL | Age: 53
End: 2023-03-30
Attending: FAMILY MEDICINE
Payer: COMMERCIAL

## 2023-10-12 ENCOUNTER — PATIENT OUTREACH (OUTPATIENT)
Dept: CASE MANAGEMENT | Age: 53
End: 2023-10-12

## 2023-10-12 NOTE — PROCEDURES
The office order for PCP removal request is Approved and finalized on October 12, 2023.     Thanks,  [facility] [employer]

## 2023-10-17 ENCOUNTER — OFFICE VISIT (OUTPATIENT)
Dept: FAMILY MEDICINE CLINIC | Facility: CLINIC | Age: 53
End: 2023-10-17

## 2023-10-17 ENCOUNTER — DOCUMENTATION ONLY (OUTPATIENT)
Dept: FAMILY MEDICINE CLINIC | Facility: CLINIC | Age: 53
End: 2023-10-17

## 2023-10-17 VITALS
DIASTOLIC BLOOD PRESSURE: 82 MMHG | HEART RATE: 86 BPM | HEIGHT: 69.4 IN | BODY MASS INDEX: 33.39 KG/M2 | WEIGHT: 228 LBS | SYSTOLIC BLOOD PRESSURE: 122 MMHG

## 2023-10-17 DIAGNOSIS — E78.5 HYPERLIPIDEMIA, UNSPECIFIED HYPERLIPIDEMIA TYPE: ICD-10-CM

## 2023-10-17 DIAGNOSIS — Z12.11 COLON CANCER SCREENING: Primary | ICD-10-CM

## 2023-10-17 DIAGNOSIS — E11.9 TYPE 2 DIABETES MELLITUS WITHOUT COMPLICATION, WITHOUT LONG-TERM CURRENT USE OF INSULIN (HCC): ICD-10-CM

## 2023-10-17 DIAGNOSIS — B35.3 TINEA PEDIS OF BOTH FEET: ICD-10-CM

## 2023-10-17 DIAGNOSIS — I10 ESSENTIAL HYPERTENSION: ICD-10-CM

## 2023-10-17 LAB
CARTRIDGE LOT#: ABNORMAL NUMERIC
HEMOGLOBIN A1C: 5.9 % (ref 4.3–5.6)

## 2023-10-17 PROCEDURE — 3008F BODY MASS INDEX DOCD: CPT | Performed by: FAMILY MEDICINE

## 2023-10-17 PROCEDURE — 3044F HG A1C LEVEL LT 7.0%: CPT | Performed by: FAMILY MEDICINE

## 2023-10-17 PROCEDURE — 3074F SYST BP LT 130 MM HG: CPT | Performed by: FAMILY MEDICINE

## 2023-10-17 PROCEDURE — 90471 IMMUNIZATION ADMIN: CPT | Performed by: FAMILY MEDICINE

## 2023-10-17 PROCEDURE — 3079F DIAST BP 80-89 MM HG: CPT | Performed by: FAMILY MEDICINE

## 2023-10-17 PROCEDURE — 99214 OFFICE O/P EST MOD 30 MIN: CPT | Performed by: FAMILY MEDICINE

## 2023-10-17 PROCEDURE — 90686 IIV4 VACC NO PRSV 0.5 ML IM: CPT | Performed by: FAMILY MEDICINE

## 2023-10-17 PROCEDURE — 83036 HEMOGLOBIN GLYCOSYLATED A1C: CPT | Performed by: FAMILY MEDICINE

## 2023-10-17 RX ORDER — CLOTRIMAZOLE 1 %
CREAM (GRAM) TOPICAL
Qty: 85 G | Refills: 0 | Status: SHIPPED | OUTPATIENT
Start: 2023-10-17

## 2023-12-28 RX ORDER — ATORVASTATIN CALCIUM 40 MG/1
40 TABLET, FILM COATED ORAL NIGHTLY
Qty: 90 TABLET | Refills: 3 | Status: SHIPPED | OUTPATIENT
Start: 2023-12-28

## 2023-12-28 NOTE — TELEPHONE ENCOUNTER
Please review; protocol failed/ has no protocol    No active /future labs noted     Requested Prescriptions   Pending Prescriptions Disp Refills    lisinopril-hydroCHLOROthiazide 20-12.5 MG Oral Tab [Pharmacy Med Name: LISINOPRIL/HCTZ 20-12.5M TAB] 180 tablet 4     Sig: Take 2 tablets by mouth daily. Hypertensive Medications Protocol Failed - 12/27/2023  1:03 PM        Failed - CMP or BMP in past 6 months     No results found for this or any previous visit (from the past 4392 hour(s)).             Passed - In person appointment in the past 12 or next 3 months     Recent Outpatient Visits              2 months ago Colon cancer screening    [address] [facility], [address], [place] [address] [facility]    Office Visit    10 months ago Annual physical exam    [address] [facility], [address], [place] [address], [place]    Office Visit    3 years ago Diabetes mellitus type 2 in obese [facility])    Leydi Gaytan MD    Virtual Phone E/M    4 years ago Diabetes mellitus type 2 in nonobese [facility])    [address] [facility] [address] [place] [place] [facility] Joaquin Ellington MD    Office Visit    4 years ago Essential hypertension    [address], Laureen Varela MD    Office Visit                      Passed - Last BP reading less than 140/90     BP Readings from Last 1 Encounters:   10/17/23 122/82               Passed - In person appointment or virtual visit in the past 6 months     Recent Outpatient Visits              2 months ago Colon cancer screening    [address] [facility], [address], [place] [place] [facility]    Office Visit    10 months ago Annual physical exam    [address] [facility], [address]  [address], [place] [place] [facility]    Office Visit    3 years ago Diabetes mellitus type 2 in obese [facility])    [facility], Rosaura Reece MD    Virtual Phone E/M    4 years ago Diabetes mellitus type 2 in nonobese [facility])    Isa Upton MD    Office Visit    4 years ago Essential hypertension    Rosales Chapman MD    Office Visit                      Passed - [facility] or [facility] > 50     GFR Evaluation  EGFRCR: 71 , resulted on 2/25/2023            amLODIPine 10 MG Oral Tab [Pharmacy Med Name: AMLODIPINE BESYLATE 10MG TAB] 90 tablet 4     Sig: Take 1 tablet (10 mg total) by mouth daily. Hypertensive Medications Protocol Failed - 12/27/2023  1:03 PM        Failed - CMP or BMP in past 6 months     No results found for this or any previous visit (from the past 4392 hour(s)).             Passed - In person appointment in the past 12 or next 3 months     Recent Outpatient Visits              2 months ago Colon cancer screening    Jeanecholo Hensley, [address]  [address], [address], [place]    Office Visit    10 months ago Annual physical exam    Ron Hensley, [address]  [address], [place], [place]    Office Visit    3 years ago Diabetes mellitus type 2 in obese [facility])    Isa Upton MD    Virtual Phone E/M    4 years ago Diabetes mellitus type 2 in nonobese [facility])    Isa Upton MD    Office Visit    4 years ago Essential hypertension    Rosales Chapman MD    Office Visit                      Passed - Last BP reading less than 140/90     BP Readings from Last 1 Encounters:   10/17/23 122/82               Passed - In person appointment or virtual visit in the past 6 months     Recent Outpatient Visits              2 months ago Colon cancer screening    [facility], [address] [facility], [place] [facility]    Office Visit    10 months ago Annual physical exam    [facility] Trevizo [facility]    Office Visit    3 years ago Diabetes mellitus type 2 in obese [facility])    Maxx Constantino MD    Virtual Phone E/M    4 years ago Diabetes mellitus type 2 in nonobese [facility])    Maxx Constantino MD    Office Visit    4 years ago Essential hypertension    Glenis Mcnulty MD    Office Visit                      Passed - [facility] or [facility] > 50     GFR Evaluation  EGFRCR: 71 , resulted on 2/25/2023           Signed Prescriptions Disp Refills    atorvastatin 40 MG Oral Tab 90 tablet 3     Sig: Take 1 tablet (40 mg total) by mouth nightly.        Cholesterol Medication Protocol Passed - 12/27/2023  1:03 PM        Passed - ALT in past 12 months        Passed - LDL in past 12 months        Passed - Last ALT < 80     Lab Results   Component Value Date    ALT 31 02/25/2023             Passed - Last LDL < 130     Lab Results   Component Value Date    LDL 66 02/25/2023             Passed - In person appointment or virtual visit in the past 12 mos or appointment in next 3 mos     Recent Outpatient Visits              2 months ago Colon cancer screening    Oneal Billingsley, [address]  [address], [place] [facility]    Office Visit    10 months ago Annual physical exam    Oneal Billingsley, [address]  [address], [place]    Office Visit    3 years ago Diabetes mellitus type 2 in obese [facility])    Maxx Constantino MD    Virtual Phone E/M    4 years ago Diabetes mellitus type 2 in nonobese [facility])    Tom Adams Gabino Olivo MD    Office Visit    4 years ago Essential hypertension    [address], Elke Rivas MD    Office Visit                         Recent Outpatient Visits              2 months ago Colon cancer screening    Emeterio Fu, [address]  [address], [place], [place]    Office Visit    10 months ago Annual physical exam    Emeterio Fu, [address]  [address], [place], [place]    Office Visit    3 years ago Diabetes mellitus type 2 in obese [facility])    Shaji Treadwell MD    Virtual Phone E/M    4 years ago Diabetes mellitus type 2 in nonobese [facility])    Shaji Treadwell MD    Office Visit    4 years ago Essential hypertension    [employer], Valdo Shepard MD    Office Visit

## 2023-12-28 NOTE — TELEPHONE ENCOUNTER
Refill passed per [employer], [facility] protocol. Requested Prescriptions   Pending Prescriptions Disp Refills    LISINOPRIL-HYDROCHLOROTHIAZIDE 20-12.5 MG Oral Tab [Pharmacy Med Name: LISINOPRIL/HCTZ 20-12.5M TAB] 180 tablet 4     Sig: TAKE 2 TABLETS BY MOUTH EVERY DAY       Hypertensive Medications Protocol Failed - 12/27/2023  1:03 PM        Failed - CMP or BMP in past 6 months     No results found for this or any previous visit (from the past 4392 hour(s)).             Passed - In person appointment in the past 12 or next 3 months     Recent Outpatient Visits              2 months ago Colon cancer screening    [address] [facility], [address]  [address], [place] [address], [facility]    Office Visit    10 months ago Annual physical exam    [address] [facility], [address]  [address], [place], DO    Office Visit    3 years ago Diabetes mellitus type 2 in obese [facility])    Ava Wakefield MD    Virtual Phone E/M    4 years ago Diabetes mellitus type 2 in nonobese [facility])    Mariana Mccray, [place] Lyn Dumonttrisha Max MD    Office Visit    4 years ago Essential hypertension    [address], [place] William MD    Office Visit                      Passed - Last BP reading less than 140/90     BP Readings from Last 1 Encounters:   10/17/23 122/82               Passed - In person appointment or virtual visit in the past 6 months     Recent Outpatient Visits              2 months ago Colon cancer screening    [address] [facility], [address]  [address], [place] [address], [facility]    Office Visit    10 months ago Annual physical exam    Sedrick Maria, [address]  [address], [place] [address], [facility]    Office Visit    3 years ago Diabetes mellitus type 2 in obese [facility])    [address] [facility], Cande Ro MD    Virtual Phone E/M    4 years ago Diabetes mellitus type 2 in nonobese [facility])    [address] [place],[address], [address] [facility] Elmer Dilan rOo MD    Office Visit    4 years ago Essential hypertension    [address], [place] Phillips MD    Office Visit                      Passed - [facility] or GFRNAA > 50     GFR Evaluation  EGFRCR: 71 , resulted on 2/25/2023            ATORVASTATIN 40 MG Oral Tab [Pharmacy Med Name: ATORVASTATIN CALCIUM 40MG TAB] 90 tablet 4     Sig: TAKE 1 TABLET BY MOUTH EVERY DAY NIGHTLY       Cholesterol Medication Protocol Passed - 12/27/2023  1:03 PM        Passed - ALT in past 12 months        Passed - LDL in past 12 months        Passed - Last ALT < 80     Lab Results   Component Value Date    ALT 31 02/25/2023             Passed - Last LDL < 130     Lab Results   Component Value Date    LDL 66 02/25/2023             Passed - In person appointment or virtual visit in the past 12 mos or appointment in next 3 mos     Recent Outpatient Visits              2 months ago Colon cancer screening    [facility] [place], [address]  [address], [place] [address], [place]    Office Visit    10 months ago Annual physical exam    [facility] [place], [address]  [address], [place] [address], DO    Office Visit    3 years ago Diabetes mellitus type 2 in obese [facility])    Ana Hernández MD    Virtual Phone E/M    4 years ago Diabetes mellitus type 2 in nonobese [facility])    Ana Hernández MD    Office Visit    4 years ago Essential hypertension    Wili [facility] MD    Office Visit                        AMLODIPINE 10 MG Oral Tab [Pharmacy Med Name: AMLODIPINE BESYLATE 10MG TAB] 90 tablet 4     Sig: TAKE 1 TABLET BY MOUTH EVERY DAY Hypertensive Medications Protocol Failed - 12/27/2023  1:03 PM        Failed - CMP or BMP in past 6 months     No results found for this or any previous visit (from the past 4392 hour(s)).             Passed - In person appointment in the past 12 or next 3 months     Recent Outpatient Visits              2 months ago Colon cancer screening    [address] [facility], [address]  [address], [place] [address], [facility]    Office Visit    10 months ago Annual physical exam    [address] [facility], [address]  [address], [place] [address], DO    Office Visit    3 years ago Diabetes mellitus type 2 in obese [facility])    Lucy Anne MD    Virtual Phone E/M    4 years ago Diabetes mellitus type 2 in nonobese [facility])    [address], [address] [facility] [place] Chapis Bowman MD    Office Visit    4 years ago Essential hypertension    [address], Carmencita Barnes MD    Office Visit                      Passed - Last BP reading less than 140/90     BP Readings from Last 1 Encounters:   10/17/23 122/82               Passed - In person appointment or virtual visit in the past 6 months     Recent Outpatient Visits              2 months ago Colon cancer screening    [address] [facility], [address]  [address], [place] [address], [facility]    Office Visit    10 months ago Annual physical exam    [address] [facility], [address]  [address], [place] [address], DO    Office Visit    3 years ago Diabetes mellitus type 2 in obese [facility])    Lucy Anne MD    Virtual Phone E/M    4 years ago Diabetes mellitus type 2 in nonobese [facility])    Lucy Anne MD    Office Visit    4 years ago Essential hypertension    [employer], [address] [facility] [address],[facility] [address] Elle Rey MD    Office Visit                      [facility] or [facility] > 50     GFR Evaluation  EGFRCR: 71 , resulted on 2/25/2023             Recent Outpatient Visits              2 months ago Colon cancer screening    [facility] [place]    Office Visit    10 months ago Annual physical exam    [address] [facility], [address] [place] [address] [address], [place]    Office Visit    3 years ago Diabetes mellitus type 2 in obese [facility])    Salomón Polo MD    Virtual Phone E/M    4 years ago Diabetes mellitus type 2 in nonobese [facility])    Salomón Polo MD    Office Visit    4 years ago Essential hypertension    [employer], Jd Carrero MD    Office Visit

## 2023-12-30 NOTE — TELEPHONE ENCOUNTER
Refill passed per [facility] protocol.     Requested Prescriptions   Pending Prescriptions Disp Refills    METFORMIN 500 MG Oral Tab [Pharmacy Med Name: METFORMIN HCL 500MG TAB] 90 tablet 4     Sig: TAKE 1 TABLET BY MOUTH EVERY DAY WITH BREAKFAST.       Diabetes Medication Protocol Passed - 12/29/2023 11:02 AM        Passed - Last A1C < 7.5 and within past 6 months     Lab Results   Component Value Date    A1C 5.9 (A) 10/17/2023             Passed - In person appointment or virtual visit in the past 6 mos or appointment in next 3 mos     Recent Outpatient Visits              2 months ago Colon cancer screening    [facility] [place] Cele Ferrari,     Office Visit    11 months ago Annual physical exam    [facility] [place] Cele Ferrari,     Office Visit    3 years ago Diabetes mellitus type 2 in obese (HCC)    [facility] [place]    Virtual Phone E/M    4 years ago Diabetes mellitus type 2 in nonobese (HCC)    [facility] [place]    Office Visit    4 years ago Essential hypertension    [facility] Kaity Oconnor MD    Office Visit                      Passed - EGFRCR or GFRNAA > 50     GFR Evaluation  EGFRCR: 71 , resulted on 2/25/2023          Passed - GFR in the past 12 months             [unfilled]      [unfilled]

## 2024-01-01 RX ORDER — LISINOPRIL AND HYDROCHLOROTHIAZIDE 20; 12.5 MG/1; MG/1
2 TABLET ORAL DAILY
Qty: 180 TABLET | Refills: 4 | Status: SHIPPED | OUTPATIENT
Start: 2024-01-01

## 2024-01-01 RX ORDER — AMLODIPINE BESYLATE 10 MG/1
10 TABLET ORAL DAILY
Qty: 90 TABLET | Refills: 4 | Status: SHIPPED | OUTPATIENT
Start: 2024-01-01

## 2024-04-16 NOTE — H&P
[facility] - Gastroenterology                                                                                                  Clinic History and Physical       Referring provider: Cele Ferrari    Chief Complaint   Patient presents with    Colonoscopy Screening     HPI: declined phone    Leroy Dumont is a 54 year old Dominican speaking man with history of BMI 32, diabetes, hypertension, hyperlipidemia, knee surgery here with his spouse regarding colorectal cancer screening    Says he has never had a colonoscopy before.  This was recommended by his primary care physician.  Denies any gastrointestinal issues or symptoms including abdominal pain, altered bowel habits, blood in the stool, constipation, diarrhea.  No family history of colorectal cancer.    History, Medications, Allergies, ROS:      Past Medical History:    Essential hypertension    High cholesterol      Past Surgical History:   Procedure Laterality Date    Knee cartilage surgery      mcl/lcl      Family Hx:   Family History   Problem Relation Age of Onset    No Known Problems Mother     No Known Problems Father     Colon Cancer Neg     Prostate Cancer Neg       Social History:   Social History     Socioeconomic History    Marital status:    Tobacco Use    Smoking status: Never    Smokeless tobacco: Never   Vaping Use    Vaping status: Never Used   Substance and Sexual Activity    Alcohol use: Yes     Comment: social    Drug use: No        Medications (Active prior to today's visit):  Current Outpatient Medications   Medication Sig Dispense Refill    lisinopril-hydroCHLOROthiazide 20-12.5 MG Oral Tab Take 2 tablets by mouth daily. 180 tablet 4    amLODIPine 10 MG Oral Tab Take 1 tablet (10 mg total) by mouth daily. 90 tablet 4    metFORMIN 500 MG Oral Tab Take 1 tablet (500 mg total) by mouth daily with breakfast. 90 tablet 3     atorvastatin 40 MG Oral Tab Take 1 tablet (40 mg total) by mouth nightly. 90 tablet 3    Blood Pressure Monitoring Does not apply Device To be used daily to check bp. 1 each 0    clotrimazole 1 % External Cream Apply to dry feet 2x daily for 2 weeks.  Allow feet to dry after applying before wearing socks. 85 g 0    Blood Pressure Monitoring (BLOOD PRESSURE MONITOR/ARM) Does not apply Device Use as directed 1 Device 0     Allergies:  No Known Allergies    ROS:   Systems were reviewed and were negative except as noted in the HPI    PHYSICAL EXAM:   Blood pressure 135/84, pulse 83, height 5' 9.4\" (1.763 m), weight 225 lb 12.8 oz (102.4 kg).    General:awake, cooperative, no acute distress  HEENT: EOMI, no scleral icterus, MMM; oral pharnyx is without exudates or lesions  Neck: no lymphadenopathy; thyroid is not enlarged and without nodules  CV: RRR  Resp: non-labored breathing  Abd: soft, non-tender, non-distended  Ext: no lower extremity swelling  Neuro: Alert, Oriented X 3  Skin: no rashes, bruises  Psych: normal affect    Labs/Imaging:     Reviewed as noted in the HPI and A/P    ASSESSMENT/PLAN:   Leroy Dumont is a 54 year old Guinean speaking man with history of BMI 32, diabetes, hypertension, hyperlipidemia, knee surgery here with his spouse regarding colorectal cancer screening    Recommend:  -colonoscopy with MAC with split suprep at [facility] on a Friday and endoscopist directed sedation eligible - hold metformin    Colonoscopy consent: I have discussed the risks, benefits, and alternatives (including stool testing) to colonoscopy with the patient [who demonstrated understanding], including but not limited to the risks of bleeding, infection, pain, as well as the risks of anesthesia and perforation all leading to prolonged hospitalization, surgical intervention, or could be life threatening. I also specifically mentioned the risk of missed lesions/polyps. All questions were answered to the  patient’s satisfaction. The patient signed informed consent and elected to proceed with colonoscopy with intervention [i.e. polypectomy, biopsy, control of bleeding, etc.] as indicated. I also discussed a ride home from a family member of friend is required and driving after the procedure with sedation is not safe or recommended.    Orders This Visit:  No orders of the defined types were placed in this encounter.    Meds This Visit:  Requested Prescriptions      No prescriptions requested or ordered in this encounter     Imaging & Referrals:  None     Ronald Mock MD  [facility] - Gastroenterology  4/18/2024

## 2024-04-18 ENCOUNTER — OFFICE VISIT (OUTPATIENT)
Dept: GASTROENTEROLOGY | Facility: CLINIC | Age: 54
End: 2024-04-18

## 2024-04-18 ENCOUNTER — TELEPHONE (OUTPATIENT)
Dept: GASTROENTEROLOGY | Facility: CLINIC | Age: 54
End: 2024-04-18

## 2024-04-18 VITALS
HEIGHT: 69.4 IN | SYSTOLIC BLOOD PRESSURE: 135 MMHG | BODY MASS INDEX: 33.07 KG/M2 | WEIGHT: 225.81 LBS | DIASTOLIC BLOOD PRESSURE: 84 MMHG | HEART RATE: 83 BPM

## 2024-04-18 DIAGNOSIS — Z12.12 SCREENING FOR COLORECTAL CANCER: Primary | ICD-10-CM

## 2024-04-18 DIAGNOSIS — Z12.11 SCREENING FOR COLORECTAL CANCER: Primary | ICD-10-CM

## 2024-04-18 PROCEDURE — 3079F DIAST BP 80-89 MM HG: CPT | Performed by: INTERNAL MEDICINE

## 2024-04-18 PROCEDURE — S0285 CNSLT BEFORE SCREEN COLONOSC: HCPCS | Performed by: INTERNAL MEDICINE

## 2024-04-18 PROCEDURE — 3075F SYST BP GE 130 - 139MM HG: CPT | Performed by: INTERNAL MEDICINE

## 2024-04-18 PROCEDURE — 3008F BODY MASS INDEX DOCD: CPT | Performed by: INTERNAL MEDICINE

## 2024-04-18 RX ORDER — SODIUM, POTASSIUM,MAG SULFATES 17.5-3.13G
SOLUTION, RECONSTITUTED, ORAL ORAL
Qty: 1 EACH | Refills: 0 | Status: SHIPPED | OUTPATIENT
Start: 2024-04-18

## 2024-04-18 NOTE — PATIENT INSTRUCTIONS
1. Schedule colonoscopy with MAC at [facility] on a Friday and moderate endoscopist directed sedation eligible    2.  bowel prep from pharmacy (split suprep)    3. Medication    Hold metformin day before and day of  Otherwise, continue all medications for procedure    4. Read all bowel prep instructions carefully    5. AVOID seeds, nuts, popcorn, raw fruits and vegetables (cooked is okay) for 2-3 days before procedure    >>>Please note: if you were prescribed a bowel prep and it is too expensive or not covered by insurance, it is okay to substitute Trilyte or Golytely (or any similar generic prep). This can be done by notifying the pharmacy or calling our office.

## 2024-04-18 NOTE — TELEPHONE ENCOUNTER
Scheduled for:  Colonoscopy [address]  Provider Name:  Dr. Mock  Date:  7/12/24  [facility]  Sedation:  MAC  Time:  10:00 Am (Patient is aware arrival time is at 0900 Am)  Prep:  Split dose Suprep [place] Island[place]  Meds/Allergies Reconciled?:  Physician Reviewed   Diagnosis with codes:  Colorectal cancer screening Z12.11,Z12.12  Was patient informed to call insurance with codes (Y/N):  Yes  Referral sent?:  Referral was sent at the time of electronic surgical scheduling.  EM or [facility] notified?:  I sent an electronic request to Endo Scheduling and received a confirmation today.  Medication Orders:  Pt is aware to NOT take iron pills, herbal meds and diet supplements for 7 days before exam. Also to NOT take any form of alcohol, recreational drugs and any forms of ED meds 24 hours before exam.   Medication  Hold metformin day before and day of  Otherwise, continue all medications for procedure  Misc Orders:  Patient was informed about the new cancellation policy for his/her procedure. Patient was also given a copy of the cancellation policy at the time of the appointment and verbalized understanding.      Further instructions given by staff:  I provide prep instructions to patient at the time of the appointment and reviewed date, time and location, patient  and wife Peggy verbalized that both understood and is aware to call if they have any questions.

## 2024-06-30 ENCOUNTER — HOSPITAL ENCOUNTER (OUTPATIENT)
Age: 54
Discharge: HOME | End: 2024-06-30
Payer: COMMERCIAL

## 2024-06-30 ENCOUNTER — APPOINTMENT (OUTPATIENT)
Dept: ULTRASOUND IMAGING | Facility: HOSPITAL | Age: 54
End: 2024-06-30
Attending: PHYSICIAN ASSISTANT
Payer: COMMERCIAL

## 2024-06-30 VITALS
HEART RATE: 82 BPM | DIASTOLIC BLOOD PRESSURE: 87 MMHG | SYSTOLIC BLOOD PRESSURE: 143 MMHG | TEMPERATURE: 99 F | RESPIRATION RATE: 18 BRPM | OXYGEN SATURATION: 97 %

## 2024-06-30 DIAGNOSIS — S76.312A HAMSTRING STRAIN, LEFT, INITIAL ENCOUNTER: Primary | ICD-10-CM

## 2024-06-30 PROCEDURE — 93971 EXTREMITY STUDY: CPT | Performed by: PHYSICIAN ASSISTANT

## 2024-06-30 PROCEDURE — 99203 OFFICE O/P NEW LOW 30 MIN: CPT | Performed by: PHYSICIAN ASSISTANT

## 2024-06-30 RX ORDER — TIZANIDINE 4 MG/1
4 TABLET ORAL 3 TIMES DAILY
Qty: 15 TABLET | Refills: 0 | Status: SHIPPED | OUTPATIENT
Start: 2024-06-30 | End: 2024-07-05

## 2024-06-30 RX ORDER — LIDOCAINE 50 MG/G
1 PATCH TOPICAL EVERY 24 HOURS
Qty: 10 PATCH | Refills: 0 | Status: SHIPPED | OUTPATIENT
Start: 2024-06-30 | End: 2024-07-10

## 2024-06-30 RX ORDER — IBUPROFEN 600 MG/1
600 TABLET ORAL EVERY 8 HOURS PRN
Qty: 21 TABLET | Refills: 0 | Status: SHIPPED | OUTPATIENT
Start: 2024-06-30 | End: 2024-07-07

## 2024-06-30 NOTE — ED INITIAL ASSESSMENT (HPI)
Pt reports left posterior thigh pain that extends down leg x 1 week. Denies back pain. Denies injury/trauma.

## 2024-06-30 NOTE — ED PROVIDER NOTES
Patient Seen in: [facility]      History     Chief Complaint   Patient presents with    Leg Pain     Stated Complaint: leg pain    Subjective:   HPI    55 yo male with hx of htn, hld presenting with c/o pain to the L posterior thigh which radiates down the leg. Symptoms for the last 1 week.  Pain is constant however worse when standing.  Feels a tightening in both the thigh and the calf.  Denies injury or trauma. No back pain.  History of similar pain.  Denies recent travel.  No history of DVT/PE.    Objective:   Past Medical History:    Essential hypertension    High cholesterol              Past Surgical History:   Procedure Laterality Date    Knee cartilage surgery      mcl/lcl                Social History     Socioeconomic History    Marital status:    Tobacco Use    Smoking status: Never    Smokeless tobacco: Never   Vaping Use    Vaping status: Never Used   Substance and Sexual Activity    Alcohol use: Yes     Comment: social    Drug use: No              Review of Systems    Positive for stated Chief Complaint: Leg Pain    Other systems are as noted in HPI.  Constitutional and vital signs reviewed.      All other systems reviewed and negative except as noted above.    Physical Exam     ED Triage Vitals [06/30/24 1224]   /87   Pulse 82   Resp 18   Temp 98.8 °F (37.1 °C)   Temp src Temporal   SpO2 97 %   O2 Device None (Room air)       Current Vitals:   Vital Signs  BP: 143/87  Pulse: 82  Resp: 18  Temp: 98.8 °F (37.1 °C)  Temp src: Temporal    Oxygen Therapy  SpO2: 97 %  O2 Device: None (Room air)            Physical Exam  Vitals and nursing note reviewed.   Constitutional:       General: He is not in acute distress.  HENT:      Head: Normocephalic and atraumatic.      Right Ear: External ear normal.      Left Ear: External ear normal.      Nose: Nose normal.      Mouth/Throat:      Mouth: Mucous membranes are moist.   Eyes:      Extraocular Movements: Extraocular movements intact.       Pupils: Pupils are equal, round, and reactive to light.   Cardiovascular:      Rate and Rhythm: Normal rate.   Pulmonary:      Effort: Pulmonary effort is normal.   Abdominal:      General: Abdomen is flat.   Musculoskeletal:         General: Normal range of motion.      Cervical back: Normal range of motion.        Legs:       Comments: Patient's reported area of pain.  There is no obvious edema, erythema or ecchymosis.  TTP over the posterior thigh and calf   Skin:     General: Skin is warm.   Neurological:      General: No focal deficit present.      Mental Status: He is alert and oriented to person, place, and time.   Psychiatric:         Mood and Affect: Mood normal.         Behavior: Behavior normal.               ED Course   Labs Reviewed - No data to display     54-year-old male presenting from home for evaluation of progressive leg pain x 1 week.  Ddx-hamstring strain versus tear, DVT, Baker's cyst, hematoma    US negative for DVT. Suspect msk strain vs. Spasm. Ddc with muscle relaxer, nsaids. Supportive care and pcp follow up if the pain does not improve           MDM                                         Medical Decision Making      Disposition and Plan     Clinical Impression:  1. Hamstring strain, left, initial encounter         Disposition:  Discharge  6/30/2024  1:47 pm    Follow-up:  Zakiya Teixeira MD  [address] [place] [id number]  [phone]                Medications Prescribed:  Discharge Medication List as of 6/30/2024  1:49 PM        START taking these medications    Details   tiZANidine 4 MG Oral Tab Take 1 tablet (4 mg total) by mouth 3 (three) times daily for 5 days., Normal, Disp-15 tablet, R-0      ibuprofen 600 MG Oral Tab Take 1 tablet (600 mg total) by mouth every 8 (eight) hours as needed for Pain or Fever., Normal, Disp-21 tablet, R-0      lidocaine 5 % External Patch Place 1 patch onto the skin daily for 10 days., Normal, Disp-10 patch, R-0

## 2024-07-12 ENCOUNTER — HOSPITAL ENCOUNTER (OUTPATIENT)
Age: 54
Setting detail: HOSPITAL OUTPATIENT SURGERY
Discharge: HOME | End: 2024-07-12
Attending: INTERNAL MEDICINE | Admitting: INTERNAL MEDICINE
Payer: COMMERCIAL

## 2024-07-12 ENCOUNTER — ANESTHESIA (OUTPATIENT)
Dept: ENDOSCOPY | Age: 54
End: 2024-07-12
Payer: COMMERCIAL

## 2024-07-12 ENCOUNTER — ANESTHESIA EVENT (OUTPATIENT)
Dept: ENDOSCOPY | Age: 54
End: 2024-07-12
Payer: COMMERCIAL

## 2024-07-12 DIAGNOSIS — Z12.11 SCREENING FOR COLORECTAL CANCER: ICD-10-CM

## 2024-07-12 DIAGNOSIS — Z12.12 SCREENING FOR COLORECTAL CANCER: ICD-10-CM

## 2024-07-12 LAB — GLUCOSE BLDC GLUCOMTR-MCNC: 103 MG/DL (ref 70–99)

## 2024-07-12 PROCEDURE — 99070 SPECIAL SUPPLIES PHYS/QHP: CPT | Performed by: INTERNAL MEDICINE

## 2024-07-12 PROCEDURE — 82962 GLUCOSE BLOOD TEST: CPT

## 2024-07-12 PROCEDURE — 88305 TISSUE EXAM BY PATHOLOGIST: CPT | Performed by: INTERNAL MEDICINE

## 2024-07-12 PROCEDURE — 45385 COLONOSCOPY W/LESION REMOVAL: CPT | Performed by: INTERNAL MEDICINE

## 2024-07-12 PROCEDURE — 45380 COLONOSCOPY AND BIOPSY: CPT | Performed by: INTERNAL MEDICINE

## 2024-07-12 RX ORDER — SODIUM CHLORIDE, SODIUM LACTATE, POTASSIUM CHLORIDE, CALCIUM CHLORIDE 600; 310; 30; 20 MG/100ML; MG/100ML; MG/100ML; MG/100ML
INJECTION, SOLUTION INTRAVENOUS CONTINUOUS
Status: DISCONTINUED | OUTPATIENT
Start: 2024-07-12 | End: 2024-07-12

## 2024-07-12 RX ORDER — NALOXONE HYDROCHLORIDE 0.4 MG/ML
0.08 INJECTION, SOLUTION INTRAMUSCULAR; INTRAVENOUS; SUBCUTANEOUS ONCE AS NEEDED
Status: DISCONTINUED | OUTPATIENT
Start: 2024-07-12 | End: 2024-07-12

## 2024-07-12 RX ORDER — LIDOCAINE HYDROCHLORIDE 10 MG/ML
INJECTION, SOLUTION EPIDURAL; INFILTRATION; INTRACAUDAL; PERINEURAL AS NEEDED
Status: DISCONTINUED | OUTPATIENT
Start: 2024-07-12 | End: 2024-07-12 | Stop reason: SURG

## 2024-07-12 RX ADMIN — SODIUM CHLORIDE, SODIUM LACTATE, POTASSIUM CHLORIDE, CALCIUM CHLORIDE: 600; 310; 30; 20 INJECTION, SOLUTION INTRAVENOUS at 10:16:00

## 2024-07-12 RX ADMIN — SODIUM CHLORIDE, SODIUM LACTATE, POTASSIUM CHLORIDE, CALCIUM CHLORIDE: 600; 310; 30; 20 INJECTION, SOLUTION INTRAVENOUS at 09:53:00

## 2024-07-12 RX ADMIN — LIDOCAINE HYDROCHLORIDE 40 MG: 10 INJECTION, SOLUTION EPIDURAL; INFILTRATION; INTRACAUDAL; PERINEURAL at 09:55:00

## 2024-07-12 NOTE — ANESTHESIA POSTPROCEDURE EVALUATION
Patient: Leroy Dumont    Procedure Summary       Date: 07/12/24 Room / Location: [facility] ENDOSCOPY 01 / [facility] ENDO    Anesthesia Start: 0953 Anesthesia Stop: 1019    Procedure: COLONOSCOPY with polypectomies Diagnosis:       Screening for colorectal cancer      (colon polyps, diverticulosis, hemorrhoids)    Surgeons: Ronald Mock MD Anesthesiologist:     Anesthesia Type: general ASA Status: 3            Anesthesia Type: general    Vitals Value Taken Time   /89 07/12/24 1018   Temp 97.4 °F (36.3 °C) 07/12/24 1018   Pulse 72 07/12/24 1018   Resp 16 07/12/24 1018   SpO2 98 % 07/12/24 1018       [facility] AN Post Evaluation:   Patient Evaluated in PACU  Patient Participation: complete - patient participated  Level of Consciousness: sleepy but conscious  Pain Management: adequate  Airway Patency:patent  Dental exam unchanged from preop  Yes    Cardiovascular Status: acceptable  Respiratory Status: acceptable  Postoperative Hydration acceptable    Cristin Hampton CRNA  7/12/2024 10:19 AM

## 2024-07-12 NOTE — DISCHARGE INSTRUCTIONS
Home Care Instructions for Colonoscopy  Diet:  - Resume your regular diet as tolerated unless otherwise instructed.  - Start with light meals to minimize bloating.  - Do not drink alcohol today.    Medication:  - If you have questions about resuming your normal medications, please contact your Primary Care Physician.    Activities:  - Take it easy today. Do not return to work today.  - Do not drive today.  - Do not operate any machinery today (including kitchen equipment).    Colonoscopy:  - You may notice some rectal \"spotting\" (a little blood on the toilet tissue) for a day or two after the exam. This is normal.  - If you experience any rectal bleeding (not spotting), persistent tenderness or sharp severe abdominal pains, oral temperature over 100 degrees Fahrenheit, light-headedness or dizziness, or any other problems, contact your doctor.      **If unable to reach your doctor, please go to the [facility] Emergency Room**    - Your referring physician will receive a full report of your examination.  - If you do not hear from your doctor's office within two weeks of your biopsy, please call them for your results.    You may be able to see your laboratory results in [employer] between 4 and 7 business days.  In some cases, your physician may not have viewed the results before they are released to [employer].  If you have questions regarding your results contact the physician who ordered the test/exam by phone or via [employer] by choosing \"Ask a Medical Question.\"

## 2024-07-12 NOTE — H&P
History & Physical Examination    Patient Name: Leroy Dumont  MRN: [id number]  CSN: [id number]  YOB: 1970    Diagnosis: colorectal cancer screening    Medications Prior to Admission   Medication Sig Dispense Refill Last Dose    [] ibuprofen 600 MG Oral Tab Take 1 tablet (600 mg total) by mouth every 8 (eight) hours as needed for Pain or Fever. 21 tablet 0     [] lidocaine 5 % External Patch Place 1 patch onto the skin daily for 10 days. 10 patch 0     Na Sulfate-K Sulfate-Mg Sulf (SUPREP BOWEL PREP KIT) 17.5-3.13-1.6 GM/177ML Oral Solution Take as directed 1 each 0     lisinopril-hydroCHLOROthiazide 20-12.5 MG Oral Tab Take 2 tablets by mouth daily. 180 tablet 4 2024    amLODIPine 10 MG Oral Tab Take 1 tablet (10 mg total) by mouth daily. 90 tablet 4 2024    metFORMIN 500 MG Oral Tab Take 1 tablet (500 mg total) by mouth daily with breakfast. 90 tablet 3 7/10/2024    atorvastatin 40 MG Oral Tab Take 1 tablet (40 mg total) by mouth nightly. 90 tablet 3 7/10/2024    Blood Pressure Monitoring Does not apply Device To be used daily to check bp. 1 each 0     Blood Pressure Monitoring (BLOOD PRESSURE MONITOR/ARM) Does not apply Device Use as directed 1 Device 0     [] tiZANidine 4 MG Oral Tab Take 1 tablet (4 mg total) by mouth 3 (three) times daily for 5 days. (Patient not taking: Reported on 2024) 15 tablet 0 Not Taking    clotrimazole 1 % External Cream Apply to dry feet 2x daily for 2 weeks.  Allow feet to dry after applying before wearing socks. 85 g 0      Current Facility-Administered Medications   Medication Dose Route Frequency    lactated ringers infusion   Intravenous Continuous       Allergies: No Known Allergies    Past Medical History:    Diabetes (HCC)    Essential hypertension    High blood pressure    High cholesterol     Past Surgical History:   Procedure Laterality Date    Knee cartilage surgery      mcl/lcl     Family History   Problem Relation Age of  Onset    No Known Problems Mother     No Known Problems Father     Colon Cancer Neg     Prostate Cancer Neg      Social History     Tobacco Use    Smoking status: Never    Smokeless tobacco: Never   Substance Use Topics    Alcohol use: Yes     Alcohol/week: 5.0 standard drinks of alcohol     Types: 5 Cans of beer per week     Comment: social       SYSTEM Check if Physical Exam is Normal If not normal, please explain:   HEENT [ X]    NECK  [ X]    HEART [ X]    LUNGS [ X]    ABDOMEN [ X]    EXTREMITIES [ X]      General:awake, cooperative, no acute distress  HEENT: EOMI, no scleral icterus, MMM; oral pharnyx is without exudates or lesions  Neck: no lymphadenopathy; thyroid is not enlarged and without nodules  CV: RRR  Resp: non-labored breathing  Abd: soft, non-tender, non-distended  Ext: no lower extremity swelling  Neuro: Alert, Oriented X 3  Skin: no rashes, bruises  Psych: normal affect  I have discussed the risks and benefits and alternatives of the procedure with the patient/family.  They understand and agreed to proceed with plan of care.   Ronald Mock MD  [facility] - Gastroenterology  7/12/2024  9:50 AM

## 2024-07-12 NOTE — ANESTHESIA PREPROCEDURE EVALUATION
Anesthesia PreOp Note    HPI:     Leroy Dumont is a 54 year old male who presents for preoperative consultation requested by: Ronald Mock MD    Date of Surgery: 2024    Procedure(s):  COLONOSCOPY  Indication: Screening for colorectal cancer    Relevant Problems   No relevant active problems       NPO:  Last Liquid Consumption Date: 24  Last Liquid Consumption Time: 0600  Last Solid Consumption Date: 24  Last Solid Consumption Time: 1530  Last Liquid Consumption Date: 24          History Review:  Patient Active Problem List    Diagnosis Date Noted   • Type 2 diabetes mellitus without complication, without long-term current use of insulin (HCC) 2019   • Essential hypertension 2018   • Hyperlipidemia 2018       Past Medical History:   • Diabetes (HCC)   • Essential hypertension   • High blood pressure   • High cholesterol       Past Surgical History:   Procedure Laterality Date   • Knee cartilage surgery      mcl/lcl       Medications Prior to Admission   Medication Sig Dispense Refill Last Dose   • [] ibuprofen 600 MG Oral Tab Take 1 tablet (600 mg total) by mouth every 8 (eight) hours as needed for Pain or Fever. 21 tablet 0    • [] lidocaine 5 % External Patch Place 1 patch onto the skin daily for 10 days. 10 patch 0    • Na Sulfate-K Sulfate-Mg Sulf (SUPREP BOWEL PREP KIT) 17.5-3.13-1.6 GM/177ML Oral Solution Take as directed 1 each 0    • lisinopril-hydroCHLOROthiazide 20-12.5 MG Oral Tab Take 2 tablets by mouth daily. 180 tablet 4 2024   • amLODIPine 10 MG Oral Tab Take 1 tablet (10 mg total) by mouth daily. 90 tablet 4 2024   • metFORMIN 500 MG Oral Tab Take 1 tablet (500 mg total) by mouth daily with breakfast. 90 tablet 3 7/10/2024   • atorvastatin 40 MG Oral Tab Take 1 tablet (40 mg total) by mouth nightly. 90 tablet 3 7/10/2024   • Blood Pressure Monitoring Does not apply Device To be used daily to check bp. 1 each 0    • Blood Pressure  Monitoring (BLOOD PRESSURE MONITOR/ARM) Does not apply Device Use as directed 1 Device 0    • [] tiZANidine 4 MG Oral Tab Take 1 tablet (4 mg total) by mouth 3 (three) times daily for 5 days. (Patient not taking: Reported on 2024) 15 tablet 0 Not Taking   • clotrimazole 1 % External Cream Apply to dry feet 2x daily for 2 weeks.  Allow feet to dry after applying before wearing socks. 85 g 0      Current Facility-Administered Medications Ordered in Epic   Medication Dose Route Frequency Provider Last Rate Last Admin   • lactated ringers infusion   Intravenous Continuous Ronald Mock MD 20 mL/hr at 24 09 New Bag at 24     No current [facility]-ordered outpatient medications on file.       No Known Allergies    Family History   Problem Relation Age of Onset   • No Known Problems Mother    • No Known Problems Father    • Colon Cancer Neg    • Prostate Cancer Neg      Social History     Socioeconomic History   • Marital status:    Tobacco Use   • Smoking status: Never   • Smokeless tobacco: Never   Vaping Use   • Vaping status: Never Used   Substance and Sexual Activity   • Alcohol use: Yes     Alcohol/week: 5.0 standard drinks of alcohol     Types: 5 Cans of beer per week     Comment: social   • Drug use: No       Available pre-op labs reviewed.             Vital Signs:  Body mass index is 35.44 kg/m².   height is 1.753 m (5' 9\") and weight is 108.9 kg (240 lb). His blood pressure is 129/89 and his pulse is 89. His respiration is 15 and oxygen saturation is 99%.   Vitals:    24 1336 24 0914   BP:  129/89   Pulse:  89   Resp:  15   SpO2:  99%   Weight: 108.9 kg (240 lb)    Height: 1.753 m (5' 9\")         Anesthesia Evaluation     Patient summary reviewed and Nursing notes reviewed    Airway   Mallampati: I  TM distance: >3 FB  Neck ROM: full  Dental          Pulmonary - negative ROS and normal exam   Cardiovascular - normal exam  Exercise tolerance: good  (+) hypertension     Neuro/Psych - negative ROS     GI/Hepatic/Renal    (+) bowel prep    Endo/Other    (+) diabetes mellitus type 2 well controlled  Abdominal  - normal exam               Anesthesia Plan:   ASA:  3  Plan:   General  Informed Consent Plan and Risks Discussed With:  Patient  Discussed plan with:  Surgeon    I have informed Isaacfran Dumont and/or legal guardian or family member of the nature of the anesthetic plan, benefits, risks including possible dental damage if relevant, major complications, and any alternative forms of anesthetic management.   All of the patient's questions were answered to the best of my ability. The patient desires the anesthetic management as planned.  Cristin Hampton CRNA  7/12/2024 9:28 AM  Present on Admission:  **None**

## 2024-07-12 NOTE — OPERATIVE REPORT
Colonoscopy Report    Leroy Dumont     1970 Age 54 year old   PCP Zakiya Teixeira MD Endoscopist Ronald Mock MD     Date of procedure: 24    Procedure: Colonoscopy w/ biopsy and snare polypectomy    Pre-operative diagnosis: colorectal cancer screening    Post-operative diagnosis: see impression    Sedation: monitored anesthesia care (MAC)    Consent: We discussed the risks/benefits and alternatives to this procedure, as well as the planned sedation. Informed consent was obtained from the patient after the risks of the procedure were discussed, including but not limited to bleeding, perforation, aspiration, infection, or possibility of a missed lesion as well as the risks of anesthesia including but not limited to cardiopulmonary complications. The patient signed informed consent and elected to proceed with Colonoscopy with intervention [i.e. Biopsy, control of bleeding, dilatation, polypectomy, endoscopic mucosal resection, etc.] as indicated.    Colonoscopy procedure: Once an adequate level of sedation was obtained a digital rectal exam was completed revealing normal tone and no masses palpated. Then the lubricated tip of the [id number] diagnostic video colonoscope was carefully inserted and advanced without difficulty to the cecum using the air insufflation technique (only Co2 was used for insufflation). The cecum was identified by localizing the trifold, the appendix and the ileocecal valve. Withdrawal was begun with thorough washing and careful examination of the colonic walls and folds. The ascending colon was viewed twice in the forward view. Photodocumentation was obtained. The bowel prep was adequate. Views of the colon were adequate with washing. Withdrawal time was 17 minutes.    Air was then withdrawn and the endoscope was removed. The patient tolerated the procedure well. There were no immediate postoperative complications. The patient’s vital signs were monitored  throughout the procedure and remained stable.    Estimated blood loss: insignificant    Specimens collected:  colon and rectal polyps    Complications: none     Colonoscopy findings:  There was scattered diverticulosis throughout the entire colon. Otherwise, as below.    Cecum: there was a 3 mm polyp removed by cold biopsy forceps. Otherwise, normal mucosa and vascular pattern    Ascending colon: there was a 6 mm polyp removed by cold snare polypectomy. Otherwise, normal mucosa and vascular pattern    Transverse colon: there was a 3 mm polyp removed by cold biopsy forceps. Otherwise, normal mucosa and vascular pattern    Descending colon:  there was a 7 mm polyp removed by cold snare polypectomy. Otherwise, normal mucosa and vascular pattern    Sigmoid colon: normal mucosa and vascular pattern    Rectum: retroflexed view showed small non-bleeding hemorrhoids. There was a 3 mm polyp in the distal rectum removed by cold biopsy forceps. Otherwise, normal mucosa and vascular pattern.    Impression:  1. 5 polyps removed  2. Pan-colonic diverticulosis  3. Small hemorrhoids  4. Otherwise, unremarkable colonoscopy    Recommend:  1. Await pathology.   2. Repeat colonoscopy interval pending final pathology results. If new signs or symptoms develop, procedure may need to be repeated sooner.   3. Continue your current medications  4. Increase fiber in the diet  5. Follow up with your primary care physician on a routine basis    >>>If biopsies were performed and you have not received your pathology results either by phone or letter within 2 weeks, please call our office at [phone].    MD [facility] - Gastroenterology  7/12/2024

## 2024-07-13 VITALS
OXYGEN SATURATION: 97 % | RESPIRATION RATE: 20 BRPM | HEART RATE: 67 BPM | TEMPERATURE: 97 F | BODY MASS INDEX: 35.55 KG/M2 | SYSTOLIC BLOOD PRESSURE: 118 MMHG | WEIGHT: 240 LBS | HEIGHT: 69 IN | DIASTOLIC BLOOD PRESSURE: 78 MMHG

## 2024-07-15 ENCOUNTER — TELEPHONE (OUTPATIENT)
Facility: CLINIC | Age: 54
End: 2024-07-15

## 2024-07-15 NOTE — PROGRESS NOTES
GI staff: please place recall for colonoscopy in 3 years     Thanks    Ronald Mock MD  [facility] - Gastroenterology  7/15/2024  9:31 AM

## 2024-07-15 NOTE — TELEPHONE ENCOUNTER
Recall colonoscopy in 3 years per Dr Mock.    Colon done 7/12/2024    Health maintenance updated and message sent to patient outreach to repeat colonoscopy in 3 years    Results seen by patient via mychart  Seen by patient Leroy Dumont on 7/15/2024 10:14 AM

## 2024-07-15 NOTE — TELEPHONE ENCOUNTER
----- Message from Ronald Mock sent at 7/15/2024  9:32 AM CDT -----  GI staff: please place recall for colonoscopy in 3 years     Thanks    Ronald Mock MD  [facility]  7/15/2024  9:31 AM

## 2025-03-30 ENCOUNTER — OFFICE VISIT (OUTPATIENT)
Dept: FAMILY MEDICINE CLINIC | Facility: CLINIC | Age: 55
End: 2025-03-30

## 2025-03-30 VITALS
DIASTOLIC BLOOD PRESSURE: 85 MMHG | TEMPERATURE: 99 F | RESPIRATION RATE: 16 BRPM | BODY MASS INDEX: 32.7 KG/M2 | SYSTOLIC BLOOD PRESSURE: 131 MMHG | WEIGHT: 225.88 LBS | HEIGHT: 69.5 IN | HEART RATE: 90 BPM

## 2025-03-30 DIAGNOSIS — R20.2 NUMBNESS AND TINGLING IN RIGHT HAND: ICD-10-CM

## 2025-03-30 DIAGNOSIS — R20.0 NUMBNESS AND TINGLING IN RIGHT HAND: ICD-10-CM

## 2025-03-30 DIAGNOSIS — Z23 ENCOUNTER FOR ADMINISTRATION OF VACCINE: ICD-10-CM

## 2025-03-30 DIAGNOSIS — I10 ESSENTIAL HYPERTENSION: ICD-10-CM

## 2025-03-30 DIAGNOSIS — E78.5 HYPERLIPIDEMIA, UNSPECIFIED HYPERLIPIDEMIA TYPE: ICD-10-CM

## 2025-03-30 DIAGNOSIS — Z12.5 SCREENING FOR MALIGNANT NEOPLASM OF PROSTATE: ICD-10-CM

## 2025-03-30 DIAGNOSIS — Z00.00 WELL ADULT EXAM: Primary | ICD-10-CM

## 2025-03-30 DIAGNOSIS — B35.3 TINEA PEDIS OF BOTH FEET: ICD-10-CM

## 2025-03-30 DIAGNOSIS — E11.9 TYPE 2 DIABETES MELLITUS WITHOUT COMPLICATION, WITHOUT LONG-TERM CURRENT USE OF INSULIN (HCC): ICD-10-CM

## 2025-03-30 RX ORDER — CLOTRIMAZOLE AND BETAMETHASONE DIPROPIONATE 10; .64 MG/G; MG/G
1 CREAM TOPICAL 2 TIMES DAILY PRN
Qty: 60 G | Refills: 2 | Status: SHIPPED | OUTPATIENT
Start: 2025-03-30

## 2025-03-30 NOTE — PROGRESS NOTES
HPI    Patient presents for annual physical.  Positive for past medical history; DM2, hypertension, hyperlipidemia.  With concerns of dryness/rash to bilateral feet.  Also with concerns of numbness and tingling to right hand.  Worse at night.  Does work with repetitive motion to right hand/arm.  Also with a history of injury to right shoulder.    Nocturia -   Colonoscopy - 7/15/2024, 3 year recall.   Diet - diet is healthy.     Exercise - exercises regularly.    Immunizations -flu and tetanus today.    Review of Systems   Musculoskeletal:         Numbness and tingling of right hand/wrist.     Skin:         Dryness and peeling of bilateral feet.   All other systems reviewed and are negative.       Vitals:    03/30/25 1431   BP: 131/85   Pulse: 90   Resp: 16   Temp: 98.9 °F (37.2 °C)   TempSrc: Oral   Weight: 225 lb 14.4 oz (102.5 kg)   Height: 5' 9.5\" (1.765 m)     Body mass index is 32.88 kg/m².    Health Maintenance   Topic Date Due    DTaP,Tdap,and Td Vaccines (1 - Tdap) Never done    Zoster Vaccines (2 of 3) 10/11/2022    Diabetes Care Dilated Eye Exam  12/15/2023    Annual Physical  02/02/2024    Diabetes Care: GFR  02/25/2024    Diabetes Care A1C  04/17/2024    COVID-19 Vaccine (4 - 2024-25 season) 09/01/2024    Influenza Vaccine (1) 10/01/2024    Annual Depression Screening  01/01/2025    Diabetes Care: Foot Exam (Annual)  01/01/2025    Diabetes Care: Microalb/Creat Ratio (Annual)  01/01/2025    PSA  02/25/2025    Colorectal Cancer Screening  07/12/2027    Pneumococcal Vaccine: 50+ Years  Completed    Meningococcal B Vaccine  Aged Out       Past Medical History:    Colon adenomas    x3    Diabetes (HCC)    Essential hypertension    High blood pressure    High cholesterol       .  Past Surgical History:   Procedure Laterality Date    Colonoscopy N/A 7/12/2024    Procedure: COLONOSCOPY with polypectomies;  Surgeon: Ronald Mock MD;  Location: [facility] ENDO    Knee cartilage surgery      [facility]/lcl       Family  History   Problem Relation Age of Onset    No Known Problems Mother     No Known Problems Father     Colon Cancer Neg     Prostate Cancer Neg        Social History     Socioeconomic History    Marital status:      Spouse name: Not on file    Number of children: Not on file    Years of education: Not on file    Highest education level: Not on file   Occupational History    Not on file   Tobacco Use    Smoking status: Never    Smokeless tobacco: Never   Vaping Use    Vaping status: Never Used   Substance and Sexual Activity    Alcohol use: Yes     Alcohol/week: 5.0 standard drinks of alcohol     Types: 5 Cans of beer per week     Comment: social    Drug use: No    Sexual activity: Yes     Partners: Female   Other Topics Concern    Caffeine Concern Not Asked    Exercise Not Asked    Seat Belt Not Asked    Special Diet Not Asked    Stress Concern Not Asked    Weight Concern Not Asked   Social History Narrative    Not on file     Social Drivers of Health     Food Insecurity: Not on file   Transportation Needs: Not on file   Stress: Not on file   Housing Stability: Not on file       Current Outpatient Medications   Medication Sig Dispense Refill    clotrimazole-betamethasone 1-0.05 % External Cream Apply 1 Application topically 2 (two) times daily as needed. 60 g 2    lisinopril-hydroCHLOROthiazide 20-12.5 MG Oral Tab Take 2 tablets by mouth daily. 180 tablet 4    amLODIPine 10 MG Oral Tab Take 1 tablet (10 mg total) by mouth daily. 90 tablet 4    metFORMIN 500 MG Oral Tab Take 1 tablet (500 mg total) by mouth daily with breakfast. 90 tablet 3    atorvastatin 40 MG Oral Tab Take 1 tablet (40 mg total) by mouth nightly. 90 tablet 3    Blood Pressure Monitoring Does not apply Device To be used daily to check bp. 1 each 0    Blood Pressure Monitoring (BLOOD PRESSURE MONITOR/ARM) Does not apply Device Use as directed 1 Device 0       Allergies:  Allergies[1]    Physical Exam  Vitals and nursing note reviewed.    Constitutional:       General: He is not in acute distress.     Appearance: Normal appearance. He is well-developed. He is not ill-appearing, toxic-appearing or diaphoretic.   HENT:      Head: Normocephalic and atraumatic.      Right Ear: Hearing, tympanic membrane, ear canal and external ear normal. There is no impacted cerumen.      Left Ear: Hearing, tympanic membrane, ear canal and external ear normal. There is no impacted cerumen.      Nose: Nose normal. No congestion or rhinorrhea.      Mouth/Throat:      Mouth: Mucous membranes are moist.      Pharynx: Oropharynx is clear. No oropharyngeal exudate or posterior oropharyngeal erythema.   Eyes:      General:         Right eye: No discharge.         Left eye: No discharge.      Conjunctiva/sclera: Conjunctivae normal.   Neck:      Thyroid: No thyromegaly.   Cardiovascular:      Rate and Rhythm: Normal rate and regular rhythm.      Pulses: Normal pulses.      Heart sounds: Normal heart sounds. No murmur heard.  Pulmonary:      Effort: Pulmonary effort is normal. No respiratory distress.      Breath sounds: Normal breath sounds. No stridor. No wheezing, rhonchi or rales.   Chest:      Chest wall: No tenderness.   Abdominal:      General: Abdomen is flat. Bowel sounds are normal. There is no distension.      Palpations: Abdomen is soft. There is no mass.      Tenderness: There is no abdominal tenderness. There is no guarding or rebound.      Hernia: No hernia is present.   Musculoskeletal:         General: Normal range of motion.      Cervical back: Normal range of motion and neck supple.   Feet:      Right foot:      Toenail Condition: Fungal disease present.     Left foot:      Toenail Condition: Fungal disease present.     Comments: Bilateral barefoot skin diabetic exam is normal, visualized feet and the appearance is normal.  Bilateral monofilament/sensation of both feet is normal.  Pulsation pedal pulse exam of both lower legs/feet is normal as  well.        Lymphadenopathy:      Cervical: No cervical adenopathy.   Skin:     General: Skin is warm and dry.   Neurological:      Mental Status: He is alert and oriented to person, place, and time.   Psychiatric:         Mood and Affect: Mood normal.         Behavior: Behavior normal.         Thought Content: Thought content normal.         Judgment: Judgment normal.          Assessment and Plan:   Problem List Items Addressed This Visit          HCC Problems    Type 2 diabetes mellitus without complication, without long-term current use of insulin (HCC)    Relevant Orders    Hemoglobin A1C    Microalb/Creat Ratio, Random Urine    Ophthalmology Referral - In Network       Cardiac and Vasculature    Essential hypertension    Relevant Orders    Comp Metabolic Panel (14)    Hyperlipidemia    Relevant Orders    Lipid Panel     Other Visit Diagnoses       Well adult exam    -  Primary    Relevant Orders    CBC With Differential With Platelet    Comp Metabolic Panel (14)    Lipid Panel    TSH W Reflex To Free T4    Hemoglobin A1C    Microalb/Creat Ratio, Random Urine    PSA, TOTAL [5363] [Q]    TETANUS, DIPHTHERIA TOXOIDS AND ACELLULAR PERTUSIS VACCINE (TDAP), >7 YEARS, IM USE    Fluzone trivalent vaccine, PF 0.5mL, 6mo+ (01946)    Screening for malignant neoplasm of prostate        Relevant Orders    PSA, TOTAL [5363] [Q]    Tinea pedis of both feet        Relevant Medications    clotrimazole-betamethasone 1-0.05 % External Cream    Encounter for administration of vaccine        Relevant Orders    TETANUS, DIPHTHERIA TOXOIDS AND ACELLULAR PERTUSIS VACCINE (TDAP), >7 YEARS, IM USE    Fluzone trivalent vaccine, PF 0.5mL, 6mo+ (69338)    Numbness and tingling in right hand        Relevant Orders    EMG (At [facility] [place])           Follow-up for fasting labs.  Tetanus and flu vaccines administered in office today.  Clotrimazole/betamethasone cream to pharmacy on file for bilateral feet.  Referral for EMG  for right hand numbness and tingling.    Discussed plan of care with patient and patient is in agreement.  All questions answered. Patient to call with questions or concerns.    Encouraged to sign up for My Chart if not already registered.        [1] No Known Allergies

## 2025-04-05 ENCOUNTER — LAB ENCOUNTER (OUTPATIENT)
Dept: LAB | Age: 55
End: 2025-04-05
Attending: NURSE PRACTITIONER
Payer: COMMERCIAL

## 2025-04-05 LAB
ALBUMIN SERPL-MCNC: 4.4 G/DL (ref 3.2–4.8)
ALBUMIN/GLOB SERPL: 1.8 {RATIO} (ref 1–2)
ALP LIVER SERPL-CCNC: 77 U/L
ALT SERPL-CCNC: 25 U/L
ANION GAP SERPL CALC-SCNC: 7 MMOL/L (ref 0–18)
AST SERPL-CCNC: 22 U/L (ref ?–34)
BASOPHILS # BLD AUTO: 0.04 X10(3) UL (ref 0–0.2)
BASOPHILS NFR BLD AUTO: 0.4 %
BILIRUB SERPL-MCNC: 0.4 MG/DL (ref 0.3–1.2)
BUN BLD-MCNC: 10 MG/DL (ref 9–23)
BUN/CREAT SERPL: 11 (ref 10–20)
CALCIUM BLD-MCNC: 9 MG/DL (ref 8.7–10.4)
CHLORIDE SERPL-SCNC: 99 MMOL/L (ref 98–112)
CHOLEST SERPL-MCNC: 133 MG/DL (ref ?–200)
CO2 SERPL-SCNC: 29 MMOL/L (ref 21–32)
CREAT BLD-MCNC: 0.91 MG/DL
CREAT UR-SCNC: 92.9 MG/DL
DEPRECATED RDW RBC AUTO: 42.6 FL (ref 35.1–46.3)
EGFRCR SERPLBLD CKD-EPI 2021: 98 ML/MIN/1.73M2 (ref 60–?)
EOSINOPHIL # BLD AUTO: 0.18 X10(3) UL (ref 0–0.7)
EOSINOPHIL NFR BLD AUTO: 1.9 %
ERYTHROCYTE [DISTWIDTH] IN BLOOD BY AUTOMATED COUNT: 12.9 % (ref 11–15)
EST. AVERAGE GLUCOSE BLD GHB EST-MCNC: 137 MG/DL (ref 68–126)
FASTING PATIENT LIPID ANSWER: YES
FASTING STATUS PATIENT QL REPORTED: YES
GLOBULIN PLAS-MCNC: 2.5 G/DL (ref 2–3.5)
GLUCOSE BLD-MCNC: 102 MG/DL (ref 70–99)
HBA1C MFR BLD: 6.4 % (ref ?–5.7)
HCT VFR BLD AUTO: 42.9 %
HDLC SERPL-MCNC: 38 MG/DL (ref 40–59)
HGB BLD-MCNC: 15 G/DL
IMM GRANULOCYTES # BLD AUTO: 0.08 X10(3) UL (ref 0–1)
IMM GRANULOCYTES NFR BLD: 0.9 %
LDLC SERPL CALC-MCNC: 71 MG/DL (ref ?–100)
LYMPHOCYTES # BLD AUTO: 1.9 X10(3) UL (ref 1–4)
LYMPHOCYTES NFR BLD AUTO: 20.2 %
MCH RBC QN AUTO: 31.5 PG (ref 26–34)
MCHC RBC AUTO-ENTMCNC: 35 G/DL (ref 31–37)
MCV RBC AUTO: 90.1 FL
MICROALBUMIN UR-MCNC: 1.1 MG/DL
MICROALBUMIN/CREAT 24H UR-RTO: 11.8 UG/MG (ref ?–30)
MONOCYTES # BLD AUTO: 0.66 X10(3) UL (ref 0.1–1)
MONOCYTES NFR BLD AUTO: 7 %
NEUTROPHILS # BLD AUTO: 6.54 X10 (3) UL (ref 1.5–7.7)
NEUTROPHILS # BLD AUTO: 6.54 X10(3) UL (ref 1.5–7.7)
NEUTROPHILS NFR BLD AUTO: 69.6 %
NONHDLC SERPL-MCNC: 95 MG/DL (ref ?–130)
OSMOLALITY SERPL CALC.SUM OF ELEC: 279 MOSM/KG (ref 275–295)
PLATELET # BLD AUTO: 282 10(3)UL (ref 150–450)
POTASSIUM SERPL-SCNC: 4.5 MMOL/L (ref 3.5–5.1)
PROT SERPL-MCNC: 6.9 G/DL (ref 5.7–8.2)
PSA SERPL-MCNC: 0.55 NG/ML (ref ?–4)
RBC # BLD AUTO: 4.76 X10(6)UL
SODIUM SERPL-SCNC: 135 MMOL/L (ref 136–145)
TRIGL SERPL-MCNC: 138 MG/DL (ref 30–149)
TSI SER-ACNC: 1.22 UIU/ML (ref 0.55–4.78)
VLDLC SERPL CALC-MCNC: 21 MG/DL (ref 0–30)
WBC # BLD AUTO: 9.4 X10(3) UL (ref 4–11)

## 2025-04-05 PROCEDURE — 80061 LIPID PANEL: CPT | Performed by: NURSE PRACTITIONER

## 2025-04-05 PROCEDURE — 85025 COMPLETE CBC W/AUTO DIFF WBC: CPT | Performed by: NURSE PRACTITIONER

## 2025-04-05 PROCEDURE — 84443 ASSAY THYROID STIM HORMONE: CPT | Performed by: NURSE PRACTITIONER

## 2025-04-05 PROCEDURE — 80053 COMPREHEN METABOLIC PANEL: CPT | Performed by: NURSE PRACTITIONER

## 2025-04-05 PROCEDURE — 84153 ASSAY OF PSA TOTAL: CPT | Performed by: NURSE PRACTITIONER

## 2025-04-05 PROCEDURE — 82570 ASSAY OF URINE CREATININE: CPT | Performed by: NURSE PRACTITIONER

## 2025-04-05 PROCEDURE — 36415 COLL VENOUS BLD VENIPUNCTURE: CPT | Performed by: NURSE PRACTITIONER

## 2025-04-05 PROCEDURE — 83036 HEMOGLOBIN GLYCOSYLATED A1C: CPT | Performed by: NURSE PRACTITIONER

## 2025-04-05 PROCEDURE — 82043 UR ALBUMIN QUANTITATIVE: CPT | Performed by: NURSE PRACTITIONER

## 2025-04-17 ENCOUNTER — PROCEDURE VISIT (OUTPATIENT)
Dept: PHYSICAL MEDICINE AND REHAB | Facility: CLINIC | Age: 55
End: 2025-04-17
Payer: COMMERCIAL

## 2025-04-17 DIAGNOSIS — G56.01 CARPAL TUNNEL SYNDROME OF RIGHT WRIST: Primary | ICD-10-CM

## 2025-04-17 PROCEDURE — 95908 NRV CNDJ TST 3-4 STUDIES: CPT | Performed by: PHYSICAL MEDICINE & REHABILITATION

## 2025-04-17 PROCEDURE — 95886 MUSC TEST DONE W/N TEST COMP: CPT | Performed by: PHYSICAL MEDICINE & REHABILITATION

## 2025-04-17 NOTE — PROGRESS NOTES
[facility]  Electromyography Consultation      History of Present Illness:    Dear Dr. Wilkins,  Thank you for the opportunity to see Leroy Dumont for electrodiagnostic consultation today. As you know the patient is a 55 year old male with a chief complaint of right hand numbness and tingling.  Symptoms are worse at night.  She also has a history of injury of the right shoulder.  Symptoms are consistent with carpal tunnel syndrome and she is here for an EMG today.    PAST MEDICAL HISTORY:  Past Medical History[1]    SURGICAL HISTORY:  Past Surgical History[2]    SOCIAL HISTORY:   Social History     Occupational History    Not on file   Tobacco Use    Smoking status: Never    Smokeless tobacco: Never   Vaping Use    Vaping status: Never Used   Substance and Sexual Activity    Alcohol use: Yes     Alcohol/week: 5.0 standard drinks of alcohol     Types: 5 Cans of beer per week     Comment: social    Drug use: No    Sexual activity: Yes     Partners: Female       FAMILY HISTORY:   Family History[3]    CURRENT MEDICATIONS:   Current Medications[4]    PHYSICAL EXAM:   There were no vitals taken for this visit.    There is no height or weight on file to calculate BMI.      General: No immediate distress   Extremities: peripheral pulses intact, no lower extremity edema bilaterally   Skin: No lesions noted.   Neuro:   Strength: Upper extremities have 5/5 strength  Muscle bulk: No atrophy  Sensation: Normal upper extremities  Reflexes: Normal upper extremities  Tinel's sign: Equivocal on the right    EMG/NCV  Sensory NCS      Nerve / Sites Distance Segments Peak Lat NP Amp    cm  ms µV   R MEDIAN - Dig III Antidr      Wrist 14 Wrist - Dig III 4.05 18.2      Ref.  Ref. 3.80 20.0      Palm 7 Palm - Dig III 2.05 19.9   R ULNAR - Dig V Antidr      Wrist 14 Wrist - Dig V 3.45 20.5      Ref.  Ref. 3.80 10.0       Motor NCS      Nerve / Sites Distance Segments Latency Amplitude Velocity  Amp.1-2 Peak Dur. Area    cm  ms mV m/s % ms mVms   R MEDIAN - APB      Wrist 8 Wrist - APB 4.00 9.9  100 5.40 32.3      Ref.  Ref. 4.40 4.0          Elbow 24 Elbow - Wrist 8.45 9.4 53.9 95.5 5.65 29.8      Ref.  Ref.   49.0      R ULNAR - ADM      Wrist 8 Wrist - ADM 3.20 8.4  100 5.70 28.0      Ref.  Ref. 4.20 5.0          B.Elbow 23 B.Elbow - Wrist 7.30 8.2 56.1 98.3 6.15 28.9      Ref.  Ref.   50.0         A.Elbow 8 A.Elbow - B.Elbow 8.15 8.4 94.1 100 6.20 28.9       EMG Summary Table     Spontaneous MUAP Recruitment    IA Fib PSW Fasc H.F. Amp Dur. PPP Pattern   R. TRICEPS N None None None None N N N N   R. BICEPS N None None None None N N N N   R. FLEX CARPI RAD N None None None None N N N N   R. FIRST D INTEROSS N None None None None N N N N   R. ABD POLL BREVIS N None None None None N N N N       Findings: Extremities were warmed with hot packs for 15 minutes prior to testing and skin temperature maintained above 32 C.  Sensory nerve conduction studies revealed a prolonged right median distal latency with delay across the transverse carpal ligament.  Amplitude was slightly small.  Right ulnar response was normal.  Motor nerve conduction studies revealed normal right median and ulnar responses with normal latencies amplitudes and conduction velocities.  Needle EMG of the right upper extremity was normal in all muscles tested.  Impression:  1.  Abnormal study.  2.  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  This is characterized by sensory demyelination and minimal axon loss.  No motor involvement.  3.  No electrodiagnostic evidence of right cervical radiculopathy.      ASSESSMENT AND PLAN:  1. Carpal tunnel syndrome of right wrist  Patient has clinical and electrodiagnostic evidence consistent with carpal tunnel syndrome.  Electrodiagnostic parameters are very mild.  He may benefit from nighttime bracing and carpal tunnel stretches found on YouTube.  If no better he  may be a candidate for carpal tunnel injection.  Surgery should be the last resort.        Thank you for the opportunity to participate in the care of this patient.  Sincerely,    Walter Desai M.D.  Diplomate American Board of Physical Medicine and Rehabilitation         [1]   Past Medical History:   Colon adenomas    x3    Diabetes (HCC)    Essential hypertension    High blood pressure    High cholesterol   [2]   Past Surgical History:  Procedure Laterality Date    Colonoscopy N/A 7/12/2024    Procedure: COLONOSCOPY with polypectomies;  Surgeon: Ronald Mock MD;  Location: [facility]    Knee cartilage surgery      mcl/lcl   [3]   Family History  Problem Relation Age of Onset    No Known Problems Mother     No Known Problems Father     Colon Cancer Neg     Prostate Cancer Neg    [4]   Current Outpatient Medications   Medication Sig Dispense Refill    clotrimazole-betamethasone 1-0.05 % External Cream Apply 1 Application topically 2 (two) times daily as needed. 60 g 2    lisinopril-hydroCHLOROthiazide 20-12.5 MG Oral Tab Take 2 tablets by mouth daily. 180 tablet 4    amLODIPine 10 MG Oral Tab Take 1 tablet (10 mg total) by mouth daily. 90 tablet 4    metFORMIN 500 MG Oral Tab Take 1 tablet (500 mg total) by mouth daily with breakfast. 90 tablet 3    atorvastatin 40 MG Oral Tab Take 1 tablet (40 mg total) by mouth nightly. 90 tablet 3    Blood Pressure Monitoring Does not apply Device To be used daily to check bp. 1 each 0    Blood Pressure Monitoring (BLOOD PRESSURE MONITOR/ARM) Does not apply Device Use as directed 1 Device 0

## 2025-04-21 ENCOUNTER — PATIENT MESSAGE (OUTPATIENT)
Dept: FAMILY MEDICINE CLINIC | Facility: CLINIC | Age: 55
End: 2025-04-21

## 2025-04-21 NOTE — TELEPHONE ENCOUNTER
Patient spouse calling for refills     metFORMIN 500 MG Oral Tab - has 8 pills left    amLODIPine 10 MG Oral Tab - has 5 pills left     atorvastatin 40 MG Oral Tab - has 6 pills left    lisinopril-hydroCHLOROthiazide 20-12.5 MG Oral Tab  - patient has 4 pills left    [facility] AT [place], [place] - [address] [phone], [phone]

## 2025-04-24 PROBLEM — G56.01 CARPAL TUNNEL SYNDROME OF RIGHT WRIST: Status: ACTIVE | Noted: 2025-04-24

## 2025-04-24 NOTE — TELEPHONE ENCOUNTER
Please review. Protocol Failed; No Protocol    Former patient of Dr. Ferrari    Future Appointments   Date Time Provider Department Center   5/1/2025  6:00 PM Claudette Lockett APRN [facility]

## 2025-04-25 ENCOUNTER — TELEPHONE (OUTPATIENT)
Dept: FAMILY MEDICINE CLINIC | Facility: CLINIC | Age: 55
End: 2025-04-25

## 2025-04-25 RX ORDER — AMLODIPINE BESYLATE 10 MG/1
10 TABLET ORAL DAILY
Qty: 90 TABLET | Refills: 4 | Status: SHIPPED | OUTPATIENT
Start: 2025-04-25

## 2025-04-25 RX ORDER — ATORVASTATIN CALCIUM 40 MG/1
40 TABLET, FILM COATED ORAL NIGHTLY
Qty: 90 TABLET | Refills: 3 | Status: SHIPPED | OUTPATIENT
Start: 2025-04-25

## 2025-04-25 RX ORDER — LISINOPRIL AND HYDROCHLOROTHIAZIDE 12.5; 2 MG/1; MG/1
2 TABLET ORAL DAILY
Qty: 180 TABLET | Refills: 3 | Status: SHIPPED | OUTPATIENT
Start: 2025-04-25

## 2025-04-25 NOTE — TELEPHONE ENCOUNTER
EMG report received and reviewed.  Please inform patient that his EMG shows carpal tunnel of the right wrist.  Per Dr. Desai's notes, symptoms are very mild.  He should treat with bracing at nighttime while sleeping and may do exercises with YouTube videos.  If symptoms are not relieved with these measures, please follow-up with physiatry for steroid injection.  Thank you.

## 2025-04-25 NOTE — TELEPHONE ENCOUNTER
Patient's spouse states patient is almost out of medication.     Patient spouse also wanted to inform Claudette ANDREWS that patient did see Dr Desai and recommendations are noted in office visit.

## 2025-04-25 NOTE — TELEPHONE ENCOUNTER
[phone] (Last signed Verbal Release verified)--> phone goes directly to busy signal, unable to leave message; [facility] message sent [1st attempt]    RN Triage - please check if patient read Direct Dermatology message, if not please make 2nd attempt to call      ENGLISH TRANSLATION OF [employer] MESSAGE SENT IS BELOW:    Good Evening Leroy,    I was unable to leave you a voicemail message as your phone goes directly to a busy signal. MINOO Burton's interpretation and recommendations related to your recent results are as followed:    Leroy's EMG shows carpal tunnel of the right wrist.  Per Dr. Desai's notes, symptoms are very mild.  He should treat with bracing [orthopedic apparatus] at nighttime while sleeping and he may do exercises with [employer] videos. If his symptoms are not relieved with these measures, please follow-up with physiatry for steroid injection.  Thank you.     If you have any questions, please call our office at [phone], say \"nurse\" to speak with a Nurse. You may also ask for a .    Our phone hours are:  Monday - Friday 8 AM - 4:30 PM  Saturday  Closed  Sunday   Closed    Thank You,  Rebekah RN-BSN

## (undated) DEVICE — GIJAW SINGLE-USE BIOPSY FORCEPS WITH NEEDLE: Brand: GIJAW

## (undated) DEVICE — KIT CLEAN ENDOKIT 1.1OZ GOWNX2

## (undated) DEVICE — CO2 CANNULA,SSOFT,ADLT,7O2,4CO2,FEMALE: Brand: MEDLINE

## (undated) DEVICE — SYRINGE, LUER SLIP, STERILE, 60ML: Brand: MEDLINE

## (undated) DEVICE — LASSO POLYPECTOMY SNARE: Brand: LASSO

## (undated) DEVICE — KIT MFLD FOR SPEC COLL

## (undated) DEVICE — KIT ENDO ORCAPOD 160/180/190

## (undated) NOTE — LETTER
[facility]  [address], [place], [place]    Authorization for Surgical Operation and Procedure                               I hereby authorize Ronald Mock MD, my physician and his/her assistants (if applicable), which may include medical students, residents, and/or fellows, to perform the following surgical operation/ procedure and administer such anesthesia as may be determined necessary by my physician: Operation/Procedure name (s) COLONOSCOPY on Leroy Dumont   2.   I recognize that during the surgical operation/procedure, unforeseen conditions may necessitate additional or different procedures than those listed above.  I, therefore, further authorize and request that the above-named surgeon, assistants, or designees perform such procedures as are, in their judgment, necessary and desirable.    3.   My surgeon/physician has discussed prior to my surgery the potential benefits, risks and side effects of this procedure; the likelihood of achieving goals; and potential problems that might occur during recuperation.  They also discussed reasonable alternatives to the procedure, including risks, benefits, and side effects related to the alternatives and risks related to not receiving this procedure.  I have had all my questions answered and I acknowledge that no guarantee has been made as to the result that may be obtained.    4.   Should the need arise during my operation/procedure, which includes change of level of care prior to discharge, I also consent to the administration of blood and/or blood products.  Further, I understand that despite careful testing and screening of blood or blood products by collecting agencies, I may still be subject to ill effects as a result of receiving a blood transfusion and/or blood products.  The following are some, but not all, of the potential risks that can occur: fever and allergic reactions, hemolytic reactions, transmission of diseases such as  Hepatitis, AIDS and Cytomegalovirus (CMV) and fluid overload.  In the event that I wish to have an autologous transfusion of my own blood, or a directed donor transfusion, I will discuss this with my physician.  Check only if Refusing Blood or Blood Products  I understand refusal of blood or blood products as deemed necessary by my physician may have serious consequences to my condition to include possible death. I hereby assume responsibility for my refusal and release the hospital, its personnel, and my physicians from any responsibility for the consequences of my refusal.    o  Refuse   5.   I authorize the use of any specimen, organs, tissues, body parts or foreign objects that may be removed from my body during the operation/procedure for diagnosis, research or teaching purposes and their subsequent disposal by hospital authorities.  I also authorize the release of specimen test results and/or written reports to my treating physician on the hospital medical staff or other referring or consulting physicians involved in my care, at the discretion of the Pathologist or my treating physician.    6.   I consent to the photographing or videotaping of the operations or procedures to be performed, including appropriate portions of my body for medical, scientific, or educational purposes, provided my identity is not revealed by the pictures or by descriptive texts accompanying them.  If the procedure has been photographed/videotaped, the surgeon will obtain the original picture, image, videotape or CD.  The hospital will not be responsible for storage, release or maintenance of the picture, image, tape or CD.    7.   I consent to the presence of a  or observers in the operating room as deemed necessary by my physician or their designees.    8.   I recognize that in the event my procedure results in extended X-Ray/fluoroscopy time, I may develop a skin reaction.    9. If I have a Do Not Attempt  Resuscitation (DNAR) order in place, that status will be suspended while in the operating room, procedural suite, and during the recovery period unless otherwise explicitly stated by me (or a person authorized to consent on my behalf). The surgeon or my attending physician will determine when the applicable recovery period ends for purposes of reinstating the DNAR order.  10. Patients having a sterilization procedure: I understand that if the procedure is successful the results will be permanent and it will therefore be impossible for me to inseminate, conceive, or bear children.  I also understand that the procedure is intended to result in sterility, although the result has not been guaranteed.   11. I acknowledge that my physician has explained sedation/analgesia administration to me including the risk and benefits I consent to the administration of sedation/analgesia as may be necessary or desirable in the judgment of my physician.    I CERTIFY THAT I HAVE READ AND FULLY UNDERSTAND THE ABOVE CONSENT TO OPERATION and/or OTHER PROCEDURE.     ____________________________________  _________________________________        ______________________________  Signature of Patient    Signature of Responsible Person                Printed Name of Responsible Person                                      ____________________________________  _____________________________                ________________________________  Signature of Witness        Date  Time         Relationship to Patient    STATEMENT OF PHYSICIAN My signature below affirms that prior to the time of the procedure; I have explained to the patient and/or his/her legal representative, the risks and benefits involved in the proposed treatment and any reasonable alternative to the proposed treatment. I have also explained the risks and benefits involved in refusal of the proposed treatment and alternatives to the proposed treatment and have answered the patient's  questions. If I have a significant financial interest in a co-management agreement or a significant financial interest in any product or implant, or other significant relationship used in this procedure/surgery, I have disclosed this and had a discussion with my patient.     _____________________________________________________              _____________________________  (Signature of Physician)                                                                                         (Date)                                   (Time)  Patient Name: Leroy Dumont      : 1970      Printed: 2024     Medical Record #: [id number]                                      Page 1 of 1

## (undated) NOTE — LETTER
[address] [address], [place]  [address] [place] [id number]  [phone]            March 22, 2019      [address] [place] [address]      Dear Robert Aggarwal:    Our office has been trying to c

## (undated) NOTE — Clinical Note
Dear Claudette,  I had the opportunity to see your patient Leroy Dumont for an EMG recently. I appreciate your confidence in me to care for your patients. Please feel free call me with any questions at [phone] or contact me through Epic.  Sincerely, Mariusz Desai MD Board Certified, Physical Medicine and Rehabilitation Specializing in Sports Medicine, Spine Medicine and Electrodiagnostic Medicine [facility]

## (undated) NOTE — LETTER
2/5/2020              Leroy Dumont        [address] [place] [address]         Dear Sander Duong,    This letter is to inform you that our office has made several attempts to reach you by phone without success.  We received a message that you were t

## (undated) NOTE — LETTER
[facility]  Administration of Anesthesia  Leroy YOON agree to be cared for by a physician anesthesiologist alone and/or with a nurse anesthetist, who is specially trained to monitor me and give me medicine to put me to sleep or keep me comfortable during my procedure    I understand that my anesthesiologist and/or anesthetist is not an employee or agent of [facility] or [employer]. He or she works for [facility]    As the patient asking for anesthesia services, I agree to:  Allow the anesthesiologist (anesthesia doctor) to give me medicine and do additional procedures as necessary. Some examples are: Starting or using an “IV” to give me medicine, fluids or blood during my procedure, and having a breathing tube placed to help me breathe when I’m asleep (intubation). In the event that my heart stops working properly, I understand that my anesthesiologist will make every effort to sustain my life, unless otherwise directed by [facility] Do Not Resuscitate documents.  Tell my anesthesia doctor before my procedure:  If I am pregnant.  The last time that I ate or drank.  iii. All of the medicines I take (including prescriptions, herbal supplements, and pills I can buy without a prescription (including street drugs/illegal medications). Failure to inform my anesthesiologist about these medicines may increase my risk of anesthetic complications.  iv.If I am allergic to anything or have had a reaction to anesthesia before.  I understand how the anesthesia medicine will help me (benefits).  I understand that with any type of anesthesia medicine there are risks:  The most common risks are: nausea, vomiting, sore throat, muscle soreness, damage to my eyes, mouth, or teeth (from breathing tube placement).  Rare risks include: remembering what happened during my procedure, allergic reactions to medications, injury to my airway, heart, lungs, vision, nerves, or muscles  and in extremely rare instances death.  My doctor has explained to me other choices available to me for my care (alternatives).  Pregnant Patients (“epidural”):  I understand that the risks of having an epidural (medicine given into my back to help control pain during labor), include itching, low blood pressure, difficulty urinating, headache or slowing of the baby’s heart. Very rare risks include infection, bleeding, seizure, irregular heart rhythms and nerve injury.  Regional Anesthesia (“spinal”, “epidural”, & “nerve blocks”):  I understand that rare but potential complications include headache, bleeding, infection, seizure, irregular heart rhythms, and nerve injury.    _____________________________________________________________________________  Patient (or Representative) Signature/Relationship to Patient  Date   Time    _____________________________________________________________________________   Name (if used)    Language/Organization   Time    _____________________________________________________________________________  Nurse Anesthetist Signature     Date   Time  _____________________________________________________________________________  Anesthesiologist Signature     Date   Time  I have discussed the procedure and information above with the patient (or patient’s representative) and answered their questions. The patient or their representative has agreed to have anesthesia services.    _____________________________________________________________________________  Witness        Date   Time  I have verified that the signature is that of the patient or patient’s representative, and that it was signed before the procedure  Patient Name: Leroy Dumont     : 1970                 Printed: 2024 at 9:08 AM    Medical Record #: [id number]                                            Page 1 of 1  ----------ANESTHESIA CONSENT----------